# Patient Record
Sex: FEMALE | Race: WHITE | NOT HISPANIC OR LATINO | Employment: OTHER | URBAN - METROPOLITAN AREA
[De-identification: names, ages, dates, MRNs, and addresses within clinical notes are randomized per-mention and may not be internally consistent; named-entity substitution may affect disease eponyms.]

---

## 2017-02-06 ENCOUNTER — OFFICE VISIT (OUTPATIENT)
Dept: ORTHOPEDIC SURGERY | Facility: CLINIC | Age: 62
End: 2017-02-06

## 2017-02-06 VITALS — WEIGHT: 182 LBS | BODY MASS INDEX: 26.05 KG/M2 | HEIGHT: 70 IN | TEMPERATURE: 98.4 F

## 2017-02-06 DIAGNOSIS — G89.29 CHRONIC RIGHT SHOULDER PAIN: Primary | ICD-10-CM

## 2017-02-06 DIAGNOSIS — M25.511 CHRONIC RIGHT SHOULDER PAIN: Primary | ICD-10-CM

## 2017-02-06 DIAGNOSIS — M75.101 TEAR OF RIGHT ROTATOR CUFF, UNSPECIFIED TEAR EXTENT: ICD-10-CM

## 2017-02-06 PROCEDURE — 73030 X-RAY EXAM OF SHOULDER: CPT | Performed by: ORTHOPAEDIC SURGERY

## 2017-02-06 PROCEDURE — 99203 OFFICE O/P NEW LOW 30 MIN: CPT | Performed by: ORTHOPAEDIC SURGERY

## 2017-02-06 RX ORDER — OMEGA-3 FATTY ACIDS/FISH OIL 300-1000MG
200 CAPSULE ORAL 2 TIMES DAILY
COMMUNITY
End: 2021-04-20

## 2017-02-06 RX ORDER — ALBUTEROL SULFATE 90 UG/1
2 AEROSOL, METERED RESPIRATORY (INHALATION) EVERY 4 HOURS PRN
COMMUNITY

## 2017-02-06 RX ORDER — VALSARTAN AND HYDROCHLOROTHIAZIDE 160; 25 MG/1; MG/1
1 TABLET ORAL DAILY
COMMUNITY
Start: 2017-01-20

## 2017-02-06 RX ORDER — ESTROGEN,CON/M-PROGEST ACET 0.45-1.5MG
1 TABLET ORAL NIGHTLY
COMMUNITY
Start: 2017-01-20 | End: 2021-04-20

## 2017-02-06 RX ORDER — OCTISALATE, AVOBENZONE, HOMOSALATE, AND OCTOCRYLENE 29.4; 29.4; 49; 25.48 MG/ML; MG/ML; MG/ML; MG/ML
1 LOTION TOPICAL DAILY
COMMUNITY

## 2017-02-06 RX ORDER — MONTELUKAST SODIUM 10 MG/1
TABLET ORAL
COMMUNITY
Start: 2017-01-20 | End: 2020-09-10

## 2017-02-06 RX ORDER — METOPROLOL SUCCINATE 50 MG/1
50 TABLET, EXTENDED RELEASE ORAL NIGHTLY
COMMUNITY
Start: 2017-01-20

## 2017-02-06 RX ORDER — TEMAZEPAM 30 MG/1
15 CAPSULE ORAL NIGHTLY
Refills: 2 | COMMUNITY
Start: 2017-01-21

## 2017-02-06 RX ORDER — ATORVASTATIN CALCIUM 40 MG/1
40 TABLET, FILM COATED ORAL DAILY
COMMUNITY
Start: 2017-01-20

## 2017-02-06 RX ORDER — CETIRIZINE HYDROCHLORIDE 10 MG/1
10 TABLET ORAL DAILY
COMMUNITY

## 2017-02-07 NOTE — PROGRESS NOTES
"  Patient: Yenny Iglesias    YOB: 1955    Medical Record Number: [id number]    Chief Complaints:  Right shoulder pain and weakness    History of Present Illness:     61 y.o. female patient who presents for reevaluation of her right shoulder.  She tells me she was doing well up until about 6 months ago when she started to notice worsening pain.  She can't recall any specific inciting event or factor.  Initially the pain was intermittent but it has become constant.  She describes it as \"a constant nuisance\".  She gets night pain and has trouble washing her hair as well as using a curling iron.  She tells me that she has trouble doing any tasks at or above shoulder level.  She describes her current pain as moderate, intermittent, and aching.  Denies any mechanical symptoms or instability.  Anti-inflammatories have helped somewhat.  She tells me that the shoulder feels similar to prior to her repair.      Allergies: No Known Allergies    Home Medications:      Current Outpatient Prescriptions:   •  albuterol (PROAIR HFA) 108 (90 BASE) MCG/ACT inhaler, Inhale., Disp: , Rfl:   •  atorvastatin (LIPITOR) 40 MG tablet, , Disp: , Rfl:   •  cetirizine (zyrTEC) 10 MG tablet, Take 10 mg by mouth., Disp: , Rfl:   •  Ibuprofen 200 MG capsule, Take  by mouth., Disp: , Rfl:   •  metoprolol succinate XL (TOPROL-XL) 50 MG 24 hr tablet, , Disp: , Rfl:   •  mometasone-formoterol (DULERA) 200-5 MCG/ACT inhaler, Inhale., Disp: , Rfl:   •  montelukast (SINGULAIR) 10 MG tablet, , Disp: , Rfl:   •  PREMPRO 0.45-1.5 MG per tablet, , Disp: , Rfl:   •  Probiotic Product (ALIGN) 4 MG capsule, Take  by mouth., Disp: , Rfl:   •  temazepam (RESTORIL) 30 MG capsule, TK ONE C PO  QHS, Disp: , Rfl: 2  •  tiotropium (SPIRIVA) 18 MCG per inhalation capsule, Place 18 mcg into inhaler and inhale., Disp: , Rfl:   •  valsartan-hydrochlorothiazide (DIOVAN-HCT) 160-25 MG per tablet, , Disp: , Rfl:     Past Medical History   Diagnosis Date   • " "Asthma    • COPD (chronic obstructive pulmonary disease)    • CTS (carpal tunnel syndrome)      B/L   • Hypertension        Past Surgical History   Procedure Laterality Date   • Back surgery  08/2008   • Ankle surgery  02/2008   • Rotator cuff repair Right 10/20/2014   • Ankle hardware removal Left 07/14/2008   • Orif tibia/fibula fractures Left 02/27/2008       Social History     Occupational History   • Not on file.     Social History Main Topics   • Smoking status: Unknown If Ever Smoked   • Smokeless tobacco: Not on file   • Alcohol use Yes   • Drug use: Not on file   • Sexual activity: Not on file      Social History     Social History Narrative       Family History   Problem Relation Age of Onset   • Hypertension Other    • Diabetes Other    • Heart disease Other        Review of Systems:      Constitutional: Denies fever, shaking or chills   Eyes: Denies change in visual acuity   HEENT: Denies nasal congestion or sore throat   Respiratory: Denies cough or shortness of breath   Cardiovascular: Denies chest pain or edema  Endocrine: Denies tremors, palpitations, intolerance of heat or cold, polyuria, polydipsia.  GI: Denies abdominal pain, nausea, vomiting, bloody stools or diarrhea  : Denies frequency, urgency, incontinence, retention, or nocturia.  Musculoskeletal: Denies numbness tingling or loss of motor function except as above  Integument: Denies rash, lesion or ulceration   Neurologic: Denies headache or focal weakness, deficits  Heme: Denies epistaxis, spontaneous or excessive bleeding, epistaxis, hematuria, melena, fatigue, enlarged or tender lymph nodes.      All other pertinent positives and negatives as noted above in HPI.      Physical Exam: 61 y.o. female    Vitals:    02/06/17 1316   Temp: 98.4 °F (36.9 °C)   Weight: 182 lb (82.6 kg)   Height: 70\" (177.8 cm)       General:  Patient is awake and alert.  Appears in no acute distress or discomfort.    Psych:  Affect and demeanor are " appropriate.    Eyes:  Conjunctiva and sclera appear grossly normal.  Eyes track well and EOM seem to be intact.    Ears:  No gross abnormalities.  Hearing adequate for the exam.    Cardiovascular:  Regular rate and rhythm.    Lungs:  Good chest expansion.  Breathing unlabored.    Lymph:  No palpable masses or adenopathy in the affected extremity    Extremities:  The right shoulder is examined.  Previous surgical incisions are well-healed.  No palpable swellings or masses.  Moderate tenderness anteriorly over the rotator interval and biceps.  Full shoulder motion.  Positive belly press test.  Pain with a Bearhuggers maneuver as well as weakness relative to the other side.  No instability.  Positive Neer and Mott maneuvers.  Positive speeds maneuver.  Good strength in her deltoid.  Intact axillary nerve sensation.  Brisk capillary refill in her fingers.         Radiology:   AP, scapular Y, and axillary views of the right shoulder are ordered and reviewed.  These are compared to her previous x-rays.  She has what appears to be mild to moderate glenohumeral osteoarthritis as evidenced by a moderate goats Beard type osteophyte off of the humeral head.  Postsurgical changes from her previous rotator cuff repair are noted.  Acromiohumeral interval appears normal.  Of note, I did go back and look at her previous MRI.  She had a large retracted rotator cuff tear prior to her surgery.    Assessment/Plan:  Recurrent right rotator cuff tear with associated glenohumeral osteoarthritis    We talked about options for her.  I suggested that we try an injection and/or therapy.  She tells me she is not interested in that.  She has been down the road before and didn't really experience any long lasting relief.  She tells me that she is potentially interested in pursuing surgical options.  At the very least, she wants to know if her tendon has re-torn her that she can make an informed decision.  I have agreed to set her up for an  MRI.  I told her to call for the results of that study.  We will come up with a plan pending review of the MRI.    Jus Valdez MD    02/06/2017

## 2017-02-17 ENCOUNTER — HOSPITAL ENCOUNTER (OUTPATIENT)
Dept: MRI IMAGING | Facility: HOSPITAL | Age: 62
Discharge: HOME | End: 2017-02-17
Attending: ORTHOPAEDIC SURGERY | Admitting: ORTHOPAEDIC SURGERY

## 2017-02-17 ENCOUNTER — TELEPHONE (OUTPATIENT)
Dept: ORTHOPEDIC SURGERY | Facility: CLINIC | Age: 62
End: 2017-02-17

## 2017-02-17 DIAGNOSIS — G89.29 CHRONIC RIGHT SHOULDER PAIN: ICD-10-CM

## 2017-02-17 DIAGNOSIS — M25.511 CHRONIC RIGHT SHOULDER PAIN: ICD-10-CM

## 2017-02-17 PROCEDURE — 73221 MRI JOINT UPR EXTREM W/O DYE: CPT

## 2017-02-20 NOTE — TELEPHONE ENCOUNTER
Pt. Asked if this was going to get to Dr. Gomes.  She does not need the images.  I told her he was a part of our office and able to view this.  XR-ED

## 2017-05-01 ENCOUNTER — OFFICE VISIT (OUTPATIENT)
Dept: ORTHOPEDIC SURGERY | Facility: CLINIC | Age: 62
End: 2017-05-01

## 2017-05-01 VITALS — WEIGHT: 178 LBS | TEMPERATURE: 97.1 F | HEIGHT: 70 IN | BODY MASS INDEX: 25.48 KG/M2

## 2017-05-01 DIAGNOSIS — M75.121 COMPLETE TEAR OF RIGHT ROTATOR CUFF: Primary | ICD-10-CM

## 2017-05-01 PROCEDURE — 20610 DRAIN/INJ JOINT/BURSA W/O US: CPT | Performed by: ORTHOPAEDIC SURGERY

## 2017-05-01 PROCEDURE — 99214 OFFICE O/P EST MOD 30 MIN: CPT | Performed by: ORTHOPAEDIC SURGERY

## 2017-05-01 RX ORDER — BUPIVACAINE HYDROCHLORIDE 5 MG/ML
2 INJECTION, SOLUTION PERINEURAL
Status: COMPLETED | OUTPATIENT
Start: 2017-05-01 | End: 2017-05-01

## 2017-05-01 RX ORDER — METHYLPREDNISOLONE ACETATE 80 MG/ML
160 INJECTION, SUSPENSION INTRA-ARTICULAR; INTRALESIONAL; INTRAMUSCULAR; SOFT TISSUE
Status: COMPLETED | OUTPATIENT
Start: 2017-05-01 | End: 2017-05-01

## 2017-05-01 RX ORDER — LIDOCAINE HYDROCHLORIDE 10 MG/ML
2 INJECTION, SOLUTION INFILTRATION; PERINEURAL
Status: COMPLETED | OUTPATIENT
Start: 2017-05-01 | End: 2017-05-01

## 2017-05-01 RX ADMIN — BUPIVACAINE HYDROCHLORIDE 2 ML: 5 INJECTION, SOLUTION PERINEURAL at 14:12

## 2017-05-01 RX ADMIN — METHYLPREDNISOLONE ACETATE 160 MG: 80 INJECTION, SUSPENSION INTRA-ARTICULAR; INTRALESIONAL; INTRAMUSCULAR; SOFT TISSUE at 14:12

## 2017-05-01 RX ADMIN — LIDOCAINE HYDROCHLORIDE 2 ML: 10 INJECTION, SOLUTION INFILTRATION; PERINEURAL at 14:12

## 2017-08-30 ENCOUNTER — CLINICAL SUPPORT (OUTPATIENT)
Dept: ORTHOPEDIC SURGERY | Facility: CLINIC | Age: 62
End: 2017-08-30

## 2017-08-30 VITALS — BODY MASS INDEX: 24.77 KG/M2 | TEMPERATURE: 98.2 F | HEIGHT: 70 IN | WEIGHT: 173 LBS

## 2017-08-30 DIAGNOSIS — M75.121 COMPLETE TEAR OF RIGHT ROTATOR CUFF: Primary | ICD-10-CM

## 2017-08-30 PROCEDURE — 99212 OFFICE O/P EST SF 10 MIN: CPT | Performed by: ORTHOPAEDIC SURGERY

## 2017-08-30 PROCEDURE — 20610 DRAIN/INJ JOINT/BURSA W/O US: CPT | Performed by: ORTHOPAEDIC SURGERY

## 2017-08-30 RX ORDER — BUPIVACAINE HYDROCHLORIDE 5 MG/ML
2 INJECTION, SOLUTION PERINEURAL
Status: COMPLETED | OUTPATIENT
Start: 2017-08-30 | End: 2017-08-30

## 2017-08-30 RX ORDER — METHYLPREDNISOLONE ACETATE 80 MG/ML
160 INJECTION, SUSPENSION INTRA-ARTICULAR; INTRALESIONAL; INTRAMUSCULAR; SOFT TISSUE
Status: COMPLETED | OUTPATIENT
Start: 2017-08-30 | End: 2017-08-30

## 2017-08-30 RX ORDER — LIDOCAINE HYDROCHLORIDE 10 MG/ML
2 INJECTION, SOLUTION INFILTRATION; PERINEURAL
Status: COMPLETED | OUTPATIENT
Start: 2017-08-30 | End: 2017-08-30

## 2017-08-30 RX ORDER — METHYLPREDNISOLONE 4 MG/1
TABLET ORAL
Qty: 21 TABLET | Refills: 0 | Status: SHIPPED | OUTPATIENT
Start: 2017-08-30 | End: 2018-06-25

## 2017-08-30 RX ADMIN — LIDOCAINE HYDROCHLORIDE 2 ML: 10 INJECTION, SOLUTION INFILTRATION; PERINEURAL at 21:21

## 2017-08-30 RX ADMIN — METHYLPREDNISOLONE ACETATE 160 MG: 80 INJECTION, SUSPENSION INTRA-ARTICULAR; INTRALESIONAL; INTRAMUSCULAR; SOFT TISSUE at 21:21

## 2017-08-30 RX ADMIN — BUPIVACAINE HYDROCHLORIDE 2 ML: 5 INJECTION, SOLUTION PERINEURAL at 21:21

## 2017-12-11 ENCOUNTER — APPOINTMENT (OUTPATIENT)
Dept: WOMENS IMAGING | Facility: HOSPITAL | Age: 62
End: 2017-12-11

## 2017-12-11 PROCEDURE — G0202 SCR MAMMO BI INCL CAD: HCPCS | Performed by: RADIOLOGY

## 2017-12-11 PROCEDURE — 77063 BREAST TOMOSYNTHESIS BI: CPT | Performed by: RADIOLOGY

## 2017-12-15 ENCOUNTER — OFFICE VISIT (OUTPATIENT)
Dept: ORTHOPEDIC SURGERY | Facility: CLINIC | Age: 62
End: 2017-12-15

## 2017-12-15 VITALS — BODY MASS INDEX: 24.22 KG/M2 | HEIGHT: 71 IN | TEMPERATURE: 97.5 F | WEIGHT: 173 LBS

## 2017-12-15 DIAGNOSIS — M75.121 COMPLETE TEAR OF RIGHT ROTATOR CUFF: Primary | ICD-10-CM

## 2017-12-15 PROCEDURE — 99212 OFFICE O/P EST SF 10 MIN: CPT | Performed by: ORTHOPAEDIC SURGERY

## 2017-12-15 PROCEDURE — 20610 DRAIN/INJ JOINT/BURSA W/O US: CPT | Performed by: ORTHOPAEDIC SURGERY

## 2017-12-15 RX ADMIN — BUPIVACAINE HYDROCHLORIDE 2 ML: 5 INJECTION, SOLUTION PERINEURAL at 14:09

## 2017-12-15 RX ADMIN — METHYLPREDNISOLONE ACETATE 160 MG: 80 INJECTION, SUSPENSION INTRA-ARTICULAR; INTRALESIONAL; INTRAMUSCULAR; SOFT TISSUE at 14:09

## 2017-12-15 RX ADMIN — LIDOCAINE HYDROCHLORIDE 2 ML: 10 INJECTION, SOLUTION INFILTRATION; PERINEURAL at 14:09

## 2017-12-17 RX ORDER — BUPIVACAINE HYDROCHLORIDE 5 MG/ML
2 INJECTION, SOLUTION PERINEURAL
Status: COMPLETED | OUTPATIENT
Start: 2017-12-15 | End: 2017-12-15

## 2017-12-17 RX ORDER — LIDOCAINE HYDROCHLORIDE 10 MG/ML
2 INJECTION, SOLUTION INFILTRATION; PERINEURAL
Status: COMPLETED | OUTPATIENT
Start: 2017-12-15 | End: 2017-12-15

## 2017-12-17 RX ORDER — METHYLPREDNISOLONE ACETATE 80 MG/ML
160 INJECTION, SUSPENSION INTRA-ARTICULAR; INTRALESIONAL; INTRAMUSCULAR; SOFT TISSUE
Status: COMPLETED | OUTPATIENT
Start: 2017-12-15 | End: 2017-12-15

## 2017-12-17 NOTE — PROGRESS NOTES
Chief Complaint:  Right shoulder injury    HPI:  Mrs. Iglesias comes in today for follow-up of her right shoulder.  She fell this past Wednesday while taking her dog out.  She felt a sharp pain in the shoulder.  She has struggled to raise the arm up since.  She describes her pain as moderate, constant, and aching.    Exam:  Skin is benign.  No step-offs or crepitus about the right shoulder.  No bony tenderness.  Passive motion is excellent.  She struggles to elevate and abduct due to discomfort.  If I raise her arm up for her, she can maintain it in an elevated position without discomfort.    Imaging:  None taken    Assessment:  Right shoulder acute on chronic rotator cuff tear    Plan:  We discussed options for her including further workup and potentially surgery.  She wants to get the injection repeated today.  The risks, benefits, and alternatives were discussed.  She consented.  Going forward, she will follow up as needed.    Large Joint Arthrocentesis  Date/Time: 12/15/2017 2:09 PM  Consent given by: patient  Site marked: site marked  Timeout: Immediately prior to procedure a time out was called to verify the correct patient, procedure, equipment, support staff and site/side marked as required   Supporting Documentation  Indications: pain and joint swelling   Procedure Details  Location: shoulder - R subacromial bursa  Preparation: Patient was prepped and draped in the usual sterile fashion  Needle size: 25 G  Approach: posterior  Medications administered: 2 mL lidocaine 1 %; 160 mg methylPREDNISolone acetate 80 MG/ML; 2 mL bupivacaine 0.5 %  Patient tolerance: patient tolerated the procedure well with no immediate complications            Jus Valdez MD  12/15/2017

## 2017-12-20 ENCOUNTER — TELEPHONE (OUTPATIENT)
Dept: ORTHOPEDIC SURGERY | Facility: CLINIC | Age: 62
End: 2017-12-20

## 2018-03-16 ENCOUNTER — OFFICE VISIT (OUTPATIENT)
Dept: ORTHOPEDIC SURGERY | Facility: CLINIC | Age: 63
End: 2018-03-16

## 2018-03-16 VITALS — WEIGHT: 178 LBS | TEMPERATURE: 98 F | HEIGHT: 70 IN | BODY MASS INDEX: 25.48 KG/M2

## 2018-03-16 DIAGNOSIS — M75.121 COMPLETE TEAR OF RIGHT ROTATOR CUFF: Primary | ICD-10-CM

## 2018-03-16 PROCEDURE — 20610 DRAIN/INJ JOINT/BURSA W/O US: CPT | Performed by: ORTHOPAEDIC SURGERY

## 2018-03-16 RX ORDER — CYCLOBENZAPRINE HCL 10 MG
10 TABLET ORAL 2 TIMES DAILY PRN
Qty: 60 TABLET | Refills: 0 | Status: SHIPPED | OUTPATIENT
Start: 2018-03-16 | End: 2020-09-10

## 2018-03-16 RX ADMIN — LIDOCAINE HYDROCHLORIDE 2 ML: 10 INJECTION, SOLUTION INFILTRATION; PERINEURAL at 15:55

## 2018-03-16 RX ADMIN — METHYLPREDNISOLONE ACETATE 160 MG: 80 INJECTION, SUSPENSION INTRA-ARTICULAR; INTRALESIONAL; INTRAMUSCULAR; SOFT TISSUE at 15:55

## 2018-03-18 RX ORDER — LIDOCAINE HYDROCHLORIDE 10 MG/ML
2 INJECTION, SOLUTION INFILTRATION; PERINEURAL
Status: COMPLETED | OUTPATIENT
Start: 2018-03-16 | End: 2018-03-16

## 2018-03-18 RX ORDER — METHYLPREDNISOLONE ACETATE 80 MG/ML
160 INJECTION, SUSPENSION INTRA-ARTICULAR; INTRALESIONAL; INTRAMUSCULAR; SOFT TISSUE
Status: COMPLETED | OUTPATIENT
Start: 2018-03-16 | End: 2018-03-16

## 2018-03-18 NOTE — PROGRESS NOTES
Ms. Iglesias comes in today for follow-up of the right shoulder.  She wants to get the injection repeated.  We again discussed the natural history of rotator cuff tears and she is simply not ready to consider any surgical options.    The risks, benefits, and alternatives to repeat injection were discussed.  She consented.  She will follow up as needed.    Large Joint Arthrocentesis  Date/Time: 3/16/2018 3:55 PM  Consent given by: patient  Site marked: site marked  Timeout: Immediately prior to procedure a time out was called to verify the correct patient, procedure, equipment, support staff and site/side marked as required   Supporting Documentation  Indications: pain and joint swelling   Procedure Details  Location: shoulder - R subacromial bursa  Preparation: Patient was prepped and draped in the usual sterile fashion  Needle size: 25 G  Approach: posterior  Medications administered: 2 mL lidocaine 1 %; 160 mg methylPREDNISolone acetate 80 MG/ML  Patient tolerance: patient tolerated the procedure well with no immediate complications

## 2018-06-25 ENCOUNTER — CLINICAL SUPPORT (OUTPATIENT)
Dept: ORTHOPEDIC SURGERY | Facility: CLINIC | Age: 63
End: 2018-06-25

## 2018-06-25 VITALS — BODY MASS INDEX: 24.77 KG/M2 | WEIGHT: 173 LBS | HEIGHT: 70 IN

## 2018-06-25 DIAGNOSIS — M75.100 TEAR OF ROTATOR CUFF, UNSPECIFIED LATERALITY, UNSPECIFIED TEAR EXTENT: ICD-10-CM

## 2018-06-25 DIAGNOSIS — M25.511 CHRONIC RIGHT SHOULDER PAIN: Primary | ICD-10-CM

## 2018-06-25 DIAGNOSIS — G89.29 CHRONIC RIGHT SHOULDER PAIN: Primary | ICD-10-CM

## 2018-06-25 PROCEDURE — 20610 DRAIN/INJ JOINT/BURSA W/O US: CPT | Performed by: ORTHOPAEDIC SURGERY

## 2018-06-25 PROCEDURE — 73030 X-RAY EXAM OF SHOULDER: CPT | Performed by: ORTHOPAEDIC SURGERY

## 2018-06-25 RX ADMIN — LIDOCAINE HYDROCHLORIDE 2 ML: 10 INJECTION, SOLUTION EPIDURAL; INFILTRATION; INTRACAUDAL; PERINEURAL at 14:53

## 2018-06-25 RX ADMIN — METHYLPREDNISOLONE ACETATE 80 MG: 80 INJECTION, SUSPENSION INTRA-ARTICULAR; INTRALESIONAL; INTRAMUSCULAR; SOFT TISSUE at 14:53

## 2018-06-25 NOTE — PROGRESS NOTES
Ms. Iglesias comes in today for follow-up of the right shoulder.  The last injection helped tremendously.  She would like to get the injection repeated today.  Repeat AP, scapular Y and axillary views of the right shoulder are ordered and reviewed.  She has findings consistent with early rotator cuff tear arthropathy with mild degenerative changes and a diminished acromiohumeral interval.  The risks, benefits, and alternatives to repeat injection were discussed.  She consented.  Going forward, she will follow up as needed.    Large Joint Arthrocentesis  Date/Time: 6/25/2018 2:53 PM  Consent given by: patient  Site marked: site marked  Timeout: Immediately prior to procedure a time out was called to verify the correct patient, procedure, equipment, support staff and site/side marked as required   Supporting Documentation  Indications: pain   Procedure Details  Location: shoulder - R subacromial bursa  Preparation: Patient was prepped and draped in the usual sterile fashion  Needle gauge: 21 g.  Approach: posterior  Medications administered: 80 mg methylPREDNISolone acetate 80 MG/ML; 2 mL lidocaine PF 1% 1 %  Patient tolerance: patient tolerated the procedure well with no immediate complications

## 2018-06-26 RX ORDER — METHYLPREDNISOLONE ACETATE 80 MG/ML
80 INJECTION, SUSPENSION INTRA-ARTICULAR; INTRALESIONAL; INTRAMUSCULAR; SOFT TISSUE
Status: COMPLETED | OUTPATIENT
Start: 2018-06-25 | End: 2018-06-25

## 2018-06-26 RX ORDER — LIDOCAINE HYDROCHLORIDE 10 MG/ML
2 INJECTION, SOLUTION EPIDURAL; INFILTRATION; INTRACAUDAL; PERINEURAL
Status: COMPLETED | OUTPATIENT
Start: 2018-06-25 | End: 2018-06-25

## 2018-12-17 ENCOUNTER — APPOINTMENT (OUTPATIENT)
Dept: WOMENS IMAGING | Facility: HOSPITAL | Age: 63
End: 2018-12-17

## 2018-12-17 PROCEDURE — 77063 BREAST TOMOSYNTHESIS BI: CPT | Performed by: RADIOLOGY

## 2018-12-17 PROCEDURE — 77067 SCR MAMMO BI INCL CAD: CPT | Performed by: RADIOLOGY

## 2019-02-01 ENCOUNTER — CLINICAL SUPPORT (OUTPATIENT)
Dept: ORTHOPEDIC SURGERY | Facility: CLINIC | Age: 64
End: 2019-02-01

## 2019-02-01 VITALS — BODY MASS INDEX: 25.62 KG/M2 | WEIGHT: 173 LBS | TEMPERATURE: 97.7 F | HEIGHT: 69 IN

## 2019-02-01 DIAGNOSIS — M75.121 COMPLETE TEAR OF RIGHT ROTATOR CUFF: Primary | ICD-10-CM

## 2019-02-01 PROCEDURE — 20610 DRAIN/INJ JOINT/BURSA W/O US: CPT | Performed by: NURSE PRACTITIONER

## 2019-02-01 RX ORDER — LIDOCAINE HYDROCHLORIDE 10 MG/ML
2 INJECTION, SOLUTION EPIDURAL; INFILTRATION; INTRACAUDAL; PERINEURAL
Status: COMPLETED | OUTPATIENT
Start: 2019-02-01 | End: 2019-02-01

## 2019-02-01 RX ORDER — METHYLPREDNISOLONE ACETATE 80 MG/ML
80 INJECTION, SUSPENSION INTRA-ARTICULAR; INTRALESIONAL; INTRAMUSCULAR; SOFT TISSUE
Status: COMPLETED | OUTPATIENT
Start: 2019-02-01 | End: 2019-02-01

## 2019-02-01 RX ADMIN — METHYLPREDNISOLONE ACETATE 80 MG: 80 INJECTION, SUSPENSION INTRA-ARTICULAR; INTRALESIONAL; INTRAMUSCULAR; SOFT TISSUE at 13:06

## 2019-02-01 RX ADMIN — LIDOCAINE HYDROCHLORIDE 2 ML: 10 INJECTION, SOLUTION EPIDURAL; INFILTRATION; INTRACAUDAL; PERINEURAL at 13:06

## 2019-02-01 NOTE — PROGRESS NOTES
Ms. Iglesias comes in today for follow-up.  The last injection helped tremendously, but reports she fell on the ice 2 weeks ago and landed directly on her right shoulder.  This episode has caused her pain to intensify.  She would like to get a repeat injection today.      We had a lengthy discussion regarding the risks of repeat steroid injections to treat pain due to rotator cuff tears.  She acknowledged understanding and reports she is not quite ready for surgery. The risks, benefits and alternatives were discussed and the patient consented.  Going forward, the patient will follow-up as needed.    PJ Mcmahan    02/01/2019    Large Joint Arthrocentesis: R subacromial bursa  Date/Time: 2/1/2019 1:06 PM  Consent given by: patient  Site marked: site marked  Timeout: Immediately prior to procedure a time out was called to verify the correct patient, procedure, equipment, support staff and site/side marked as required   Supporting Documentation  Indications: pain   Procedure Details  Location: shoulder - R subacromial bursa  Preparation: Patient was prepped and draped in the usual sterile fashion  Needle gauge: 21 G.  Approach: posterior  Medications administered: 2 mL lidocaine PF 1% 1 %; 80 mg methylPREDNISolone acetate 80 MG/ML  Patient tolerance: patient tolerated the procedure well with no immediate complications

## 2019-12-18 ENCOUNTER — APPOINTMENT (OUTPATIENT)
Dept: WOMENS IMAGING | Facility: HOSPITAL | Age: 64
End: 2019-12-18

## 2019-12-18 PROCEDURE — 77063 BREAST TOMOSYNTHESIS BI: CPT | Performed by: RADIOLOGY

## 2019-12-18 PROCEDURE — 77067 SCR MAMMO BI INCL CAD: CPT | Performed by: RADIOLOGY

## 2020-06-24 ENCOUNTER — OFFICE VISIT (OUTPATIENT)
Dept: ORTHOPEDIC SURGERY | Facility: CLINIC | Age: 65
End: 2020-06-24

## 2020-06-24 VITALS — HEIGHT: 71 IN | WEIGHT: 172 LBS | BODY MASS INDEX: 24.08 KG/M2 | TEMPERATURE: 96.7 F

## 2020-06-24 DIAGNOSIS — M25.511 RIGHT SHOULDER PAIN, UNSPECIFIED CHRONICITY: Primary | ICD-10-CM

## 2020-06-24 PROBLEM — I10 HYPERTENSION: Status: ACTIVE | Noted: 2018-10-04

## 2020-06-24 PROBLEM — J32.9 SINUSITIS: Status: ACTIVE | Noted: 2018-10-04

## 2020-06-24 PROBLEM — J45.909 ASTHMA: Status: ACTIVE | Noted: 2018-10-04

## 2020-06-24 PROBLEM — R09.02 HYPOXEMIA: Status: ACTIVE | Noted: 2018-10-04

## 2020-06-24 PROBLEM — J44.9 COPD (CHRONIC OBSTRUCTIVE PULMONARY DISEASE) (HCC): Status: ACTIVE | Noted: 2018-10-04

## 2020-06-24 PROBLEM — R91.1 LUNG NODULE: Status: ACTIVE | Noted: 2018-10-04

## 2020-06-24 PROCEDURE — 20610 DRAIN/INJ JOINT/BURSA W/O US: CPT | Performed by: ORTHOPAEDIC SURGERY

## 2020-06-24 PROCEDURE — 73030 X-RAY EXAM OF SHOULDER: CPT | Performed by: ORTHOPAEDIC SURGERY

## 2020-06-24 PROCEDURE — 99212 OFFICE O/P EST SF 10 MIN: CPT | Performed by: ORTHOPAEDIC SURGERY

## 2020-06-24 RX ORDER — METHYLPREDNISOLONE ACETATE 80 MG/ML
80 INJECTION, SUSPENSION INTRA-ARTICULAR; INTRALESIONAL; INTRAMUSCULAR; SOFT TISSUE
Status: COMPLETED | OUTPATIENT
Start: 2020-06-24 | End: 2020-06-24

## 2020-06-24 RX ORDER — LIDOCAINE HYDROCHLORIDE 20 MG/ML
2 INJECTION, SOLUTION EPIDURAL; INFILTRATION; INTRACAUDAL; PERINEURAL
Status: COMPLETED | OUTPATIENT
Start: 2020-06-24 | End: 2020-06-24

## 2020-06-24 RX ORDER — BUDESONIDE AND FORMOTEROL FUMARATE DIHYDRATE 160; 4.5 UG/1; UG/1
2 AEROSOL RESPIRATORY (INHALATION) DAILY
COMMUNITY
Start: 2019-11-05 | End: 2021-01-27

## 2020-06-24 RX ADMIN — METHYLPREDNISOLONE ACETATE 80 MG: 80 INJECTION, SUSPENSION INTRA-ARTICULAR; INTRALESIONAL; INTRAMUSCULAR; SOFT TISSUE at 15:04

## 2020-06-24 RX ADMIN — LIDOCAINE HYDROCHLORIDE 2 ML: 20 INJECTION, SOLUTION EPIDURAL; INFILTRATION; INTRACAUDAL; PERINEURAL at 15:04

## 2020-06-24 NOTE — PROGRESS NOTES
Chief complaint: Worsening right shoulder pain    Ms. Iglesias follows up today for her right shoulder.  She wants to discuss surgical options.  I told her that my opinion is that she would probably need a reverse arthroplasty.  We had a lengthy discussion about that procedure.  She had a number of questions that were answered in detail.  She is going to think about the replacement.  For now, she would like to get her injection repeated.    I did repeat x-rays on her today including AP, scapular Y and axillary views of the right shoulder.  These are compared to her previous x-rays.  She has moderate glenohumeral arthritis and a slightly diminished acromiohumeral interval consistent with chronic rotator cuff tear.  The risk, benefits and alternatives were discussed.  She consented and the injection was performed as described below.    Large Joint Arthrocentesis: R subacromial bursa  Date/Time: 6/24/2020 3:04 PM  Consent given by: patient  Site marked: site marked  Timeout: Immediately prior to procedure a time out was called to verify the correct patient, procedure, equipment, support staff and site/side marked as required   Supporting Documentation  Indications: pain   Procedure Details  Location: shoulder - R subacromial bursa  Preparation: Patient was prepped and draped in the usual sterile fashion  Needle gauge: 21 G.  Approach: posterior  Medications administered: 2 mL lidocaine PF 2% 2 %; 80 mg methylPREDNISolone acetate 80 MG/ML  Patient tolerance: patient tolerated the procedure well with no immediate complications      \

## 2020-08-12 ENCOUNTER — PREP FOR SURGERY (OUTPATIENT)
Dept: OTHER | Facility: HOSPITAL | Age: 65
End: 2020-08-12

## 2020-08-12 ENCOUNTER — TELEPHONE (OUTPATIENT)
Dept: ORTHOPEDIC SURGERY | Facility: CLINIC | Age: 65
End: 2020-08-12

## 2020-08-12 DIAGNOSIS — M75.121 COMPLETE TEAR OF RIGHT ROTATOR CUFF: Primary | ICD-10-CM

## 2020-08-12 PROBLEM — M12.819 ROTATOR CUFF ARTHROPATHY: Status: ACTIVE | Noted: 2020-08-12

## 2020-08-12 RX ORDER — ACETAMINOPHEN 500 MG
1000 TABLET ORAL ONCE
Status: CANCELLED | OUTPATIENT
Start: 2020-09-17 | End: 2020-08-12

## 2020-08-12 RX ORDER — VANCOMYCIN HYDROCHLORIDE 1 G/200ML
1000 INJECTION, SOLUTION INTRAVENOUS ONCE
Status: CANCELLED | OUTPATIENT
Start: 2020-09-17 | End: 2020-08-12

## 2020-08-12 RX ORDER — PREGABALIN 75 MG/1
150 CAPSULE ORAL ONCE
Status: CANCELLED | OUTPATIENT
Start: 2020-09-17 | End: 2020-08-12

## 2020-08-12 RX ORDER — CEFAZOLIN SODIUM 2 G/100ML
2 INJECTION, SOLUTION INTRAVENOUS ONCE
Status: CANCELLED | OUTPATIENT
Start: 2020-09-17 | End: 2020-08-12

## 2020-08-12 RX ORDER — MELOXICAM 15 MG/1
15 TABLET ORAL ONCE
Status: CANCELLED | OUTPATIENT
Start: 2020-09-17 | End: 2020-08-12

## 2020-08-31 PROBLEM — M75.121 COMPLETE TEAR OF RIGHT ROTATOR CUFF: Status: ACTIVE | Noted: 2020-08-31

## 2020-09-08 ENCOUNTER — TRANSCRIBE ORDERS (OUTPATIENT)
Dept: PREADMISSION TESTING | Facility: HOSPITAL | Age: 65
End: 2020-09-08

## 2020-09-08 DIAGNOSIS — Z01.818 OTHER SPECIFIED PRE-OPERATIVE EXAMINATION: Primary | ICD-10-CM

## 2020-09-10 ENCOUNTER — APPOINTMENT (OUTPATIENT)
Dept: PREADMISSION TESTING | Facility: HOSPITAL | Age: 65
End: 2020-09-10

## 2020-09-10 VITALS
DIASTOLIC BLOOD PRESSURE: 83 MMHG | BODY MASS INDEX: 25.48 KG/M2 | OXYGEN SATURATION: 95 % | RESPIRATION RATE: 20 BRPM | HEIGHT: 69 IN | TEMPERATURE: 97.6 F | WEIGHT: 172 LBS | SYSTOLIC BLOOD PRESSURE: 147 MMHG | HEART RATE: 72 BPM

## 2020-09-10 DIAGNOSIS — M75.121 NONTRAUMATIC COMPLETE TEAR OF RIGHT ROTATOR CUFF: ICD-10-CM

## 2020-09-10 DIAGNOSIS — M75.121 COMPLETE TEAR OF RIGHT ROTATOR CUFF: ICD-10-CM

## 2020-09-10 LAB
ANION GAP SERPL CALCULATED.3IONS-SCNC: 10.3 MMOL/L (ref 5–15)
BILIRUB UR QL STRIP: NEGATIVE
BUN SERPL-MCNC: 9 MG/DL (ref 8–23)
BUN/CREAT SERPL: 16.7 (ref 7–25)
CALCIUM SPEC-SCNC: 9.3 MG/DL (ref 8.6–10.5)
CHLORIDE SERPL-SCNC: 101 MMOL/L (ref 98–107)
CLARITY UR: CLEAR
CO2 SERPL-SCNC: 24.7 MMOL/L (ref 22–29)
COLOR UR: YELLOW
CREAT SERPL-MCNC: 0.54 MG/DL (ref 0.57–1)
DEPRECATED RDW RBC AUTO: 44.5 FL (ref 37–54)
ERYTHROCYTE [DISTWIDTH] IN BLOOD BY AUTOMATED COUNT: 13.6 % (ref 12.3–15.4)
GFR SERPL CREATININE-BSD FRML MDRD: 113 ML/MIN/1.73
GFR SERPL CREATININE-BSD FRML MDRD: 136 ML/MIN/1.73
GLUCOSE SERPL-MCNC: 117 MG/DL (ref 65–99)
GLUCOSE UR STRIP-MCNC: NEGATIVE MG/DL
HCT VFR BLD AUTO: 38.1 % (ref 34–46.6)
HGB BLD-MCNC: 13 G/DL (ref 12–15.9)
HGB UR QL STRIP.AUTO: NEGATIVE
KETONES UR QL STRIP: NEGATIVE
LEUKOCYTE ESTERASE UR QL STRIP.AUTO: NEGATIVE
MCH RBC QN AUTO: 30.3 PG (ref 26.6–33)
MCHC RBC AUTO-ENTMCNC: 34.1 G/DL (ref 31.5–35.7)
MCV RBC AUTO: 88.8 FL (ref 79–97)
NITRITE UR QL STRIP: NEGATIVE
PH UR STRIP.AUTO: 6.5 [PH] (ref 5–8)
PLATELET # BLD AUTO: 233 10*3/MM3 (ref 140–450)
PMV BLD AUTO: 9.2 FL (ref 6–12)
POTASSIUM SERPL-SCNC: 3.3 MMOL/L (ref 3.5–5.2)
PROT UR QL STRIP: NEGATIVE
RBC # BLD AUTO: 4.29 10*6/MM3 (ref 3.77–5.28)
SODIUM SERPL-SCNC: 136 MMOL/L (ref 136–145)
SP GR UR STRIP: 1.01 (ref 1–1.03)
UROBILINOGEN UR QL STRIP: NORMAL
WBC # BLD AUTO: 6.94 10*3/MM3 (ref 3.4–10.8)

## 2020-09-10 PROCEDURE — 80048 BASIC METABOLIC PNL TOTAL CA: CPT | Performed by: ORTHOPAEDIC SURGERY

## 2020-09-10 PROCEDURE — 36415 COLL VENOUS BLD VENIPUNCTURE: CPT

## 2020-09-10 PROCEDURE — 93010 ELECTROCARDIOGRAM REPORT: CPT | Performed by: INTERNAL MEDICINE

## 2020-09-10 PROCEDURE — 93005 ELECTROCARDIOGRAM TRACING: CPT

## 2020-09-10 PROCEDURE — 85027 COMPLETE CBC AUTOMATED: CPT | Performed by: ORTHOPAEDIC SURGERY

## 2020-09-10 PROCEDURE — 81003 URINALYSIS AUTO W/O SCOPE: CPT | Performed by: ORTHOPAEDIC SURGERY

## 2020-09-10 RX ORDER — ASPIRIN 81 MG/1
81 TABLET ORAL DAILY
COMMUNITY

## 2020-09-10 RX ORDER — CHOLECALCIFEROL (VITAMIN D3) 50 MCG
2000 TABLET ORAL DAILY
COMMUNITY

## 2020-09-10 RX ORDER — ACETAMINOPHEN 325 MG/1
650 TABLET ORAL EVERY 6 HOURS PRN
COMMUNITY
End: 2020-09-18 | Stop reason: HOSPADM

## 2020-09-10 RX ORDER — CHLORHEXIDINE GLUCONATE 500 MG/1
CLOTH TOPICAL
COMMUNITY
End: 2020-09-18 | Stop reason: HOSPADM

## 2020-09-10 NOTE — DISCHARGE INSTRUCTIONS
CHLORHEXIDINE CLOTH INSTRUCTIONS  The morning of surgery follow these instructions using the Chlorhexidine cloths you've been given.  These steps reduce bacteria on the body.  Do not use the cloths near your eyes, ears mouth, genitalia or on open wounds.  Throw the cloths away after use but do not try to flush them down a toilet.      • Open and remove one cloth at a time from the package.    • Leave the cloth unfolded and begin the bathing.  • Massage the skin with the cloths using gentle pressure to remove bacteria.  Do not scrub harshly.   • Follow the steps below with one 2% CHG cloth per area (6 total cloths).  • One cloth for neck, shoulders and chest.  • One cloth for both arms, hands, fingers and underarms (do underarms last).  • One cloth for the abdomen followed by groin.  • One cloth for right leg and foot including between the toes.  • One cloth for left leg and foot including between the toes.  • The last cloth is to be used for the back of the neck, back and buttocks.    Allow the CHG to air dry 3 minutes on the skin which will give it time to work and decrease the chance of irritation.  The skin may feel sticky until it is dry.  Do not rinse with water or any other liquid or you will lose the beneficial effects of the CHG.  If mild skin irritation occurs, do rinse the skin to remove the CHG.  Report this to the nurse at time of admission.  Do not apply lotions, creams, ointments, deodorants or perfumes after using the clothes. Dress in clean clothes before coming to the hospital.    BACTROBAN NASAL OINTMENT  There are many germs normally in your nose. Bactroban is an ointment that will help reduce these germs. Please follow these instructions for Bactroban use:      ____The day before surgery in the morning  Date________    ____The day before surgery in the evening              Date________    ____The day of surgery in the morning    Date________    **Squirt ½ package of Bactroban Ointment onto a  cotton applicator and apply to inside of 1st nostril.  Squirt the remaining Bactroban and apply to the inside of the other nostril.      Take the following medications the morning of surgery:  INHALERS    ARRIVAL TIME 0600 TO MAIN OR       If you are on prescription narcotic pain medication to control your pain you may also take that medication the morning of surgery.    General Instructions:  • Do not eat solid food after midnight the night before surgery.  • You may drink clear liquids day of surgery but must stop at least one hour before your hospital arrival time - CUTOFF TIME 0500  • It is beneficial for you to have a clear drink that contains carbohydrates the day of surgery.  We suggest a 12 to 20 ounce bottle of Gatorade or Powerade for non-diabetic patients or a 12 to 20 ounce bottle of G2 or Powerade Zero for diabetic patients. (Pediatric patients, are not advised to drink a 12 to 20 ounce carbohydrate drink)    Clear liquids are liquids you can see through.  Nothing red in color.     Plain water                               Sports drinks  Sodas                                   Gelatin (Jell-O)  Fruit juices without pulp such as white grape juice and apple juice  Popsicles that contain no fruit or yogurt  Tea or coffee (no cream or milk added)  Gatorade / Powerade  G2 / Powerade Zero    • Infants may have breast milk up to four hours before surgery.  • Infants drinking formula may drink formula up to six hours before surgery.   • Patients who avoid smoking, chewing tobacco and alcohol for 4 weeks prior to surgery have a reduced risk of post-operative complications.  Quit smoking as many days before surgery as you can.  • Do not smoke, use chewing tobacco or drink alcohol the day of surgery.   • If applicable bring your C-PAP/ BI-PAP machine.  • Bring any papers given to you in the doctor’s office.  • Wear clean comfortable clothes.  • Do not wear contact lenses, false eyelashes or make-up.  Bring a case  for your glasses.   • Bring crutches or walker if applicable.  • Remove all piercings.  Leave jewelry and any other valuables at home.  • Hair extensions with metal clips must be removed prior to surgery.  • The Pre-Admission Testing nurse will instruct you to bring medications if unable to obtain an accurate list in Pre-Admission Testing.            Preventing a Surgical Site Infection:  • For 2 to 3 days before surgery, avoid shaving with a razor because the razor can irritate skin and make it easier to develop an infection.    • Any areas of open skin can increase the risk of a post-operative wound infection by allowing bacteria to enter and travel throughout the body.  Notify your surgeon if you have any skin wounds / rashes even if it is not near the expected surgical site.  The area will need assessed to determine if surgery should be delayed until it is healed.  • The night prior to surgery shower using a fresh bar of anti-bacterial soap (such as Dial) and clean washcloth.  Sleep in a clean bed with clean clothing.  Do not allow pets to sleep with you.  • Shower on the morning of surgery using a fresh bar of anti-bacterial soap (such as Dial) and clean washcloth.  Dry with a clean towel and dress in clean clothing.  • Ask your surgeon if you will be receiving antibiotics prior to surgery.  • Make sure you, your family, and all healthcare providers clean their hands with soap and water or an alcohol based hand  before caring for you or your wound.    Day of surgery:  Your arrival time is approximately two hours before your scheduled surgery time.  Upon arrival, a Pre-op nurse and Anesthesiologist will review your health history, obtain vital signs, and answer questions you may have.  The only belongings needed at this time will be a list of your home medications and if applicable your C-PAP/BI-PAP machine.  If you are staying overnight your family can leave the rest of your belongings in the car and  bring them to your room later.  A Pre-op nurse will start an IV and you may receive medication in preparation for surgery, including something to help you relax.  Your family will be able to see you in the Pre-op area.  Two visitors at a time will be allowed in the Pre-op room.  While you are in surgery your family should notify the waiting room  if they leave the waiting room area and provide a contact phone number.    Please be aware that surgery does come with discomfort.  We want to make every effort to control your discomfort so please discuss any uncontrolled symptoms with your nurse.   Your doctor will most likely have prescribed pain medications.      If you are going home after surgery you will receive individualized written care instructions before being discharged.  A responsible adult must drive you to and from the hospital on the day of your surgery and stay with you for 24 hours.    If you are staying overnight following surgery, you will be transported to your hospital room following the recovery period.  [facility] has all private rooms.    If you have any questions please call Pre-Admission Testing at [phone].  Deductibles and co-payments are collected on the day of service. Please be prepared to pay the required co-pay, deductible or deposit on the day of service as defined by your plan.    Patient Education for Self-Quarantine Process    Following your COVID testing, we strongly recommend that you do not leave your home after you have been tested for COVID except to get medical care. This includes not going to work, school or to public areas.  If this is not possible for you to do please limit your activities to only required outings.  Be sure to wear a mask when you are with other people, practice social distancing and wash your hands frequently.      The following items provide additional details to keep you safe.  • Wash your hands with soap and water frequently  for at least 20 seconds.   • Avoid touching your eyes, nose and mouth with unwashed hands.  • Do not share anything - utensils, towels, food from the same bowl.   • Have your own utensils, drinking glass, dishes, towels and bedding.   • Do not have visitors.   • Do use FaceTime to stay in touch with family and friends.  • You should stay in a specific room away from others if possible.   • Stay at least 6 feet away from others in the home if you cannot have a dedicated room to yourself.   • Do not snuggle with your pet. While the CDC says there is no evidence that pets can spread COVID-19 or be infected from humans, it is probably best to avoid “petting, snuggling, being kissed or licked and sharing food (during self-quarantine)”, according to the CDC.   • Sanitize household surfaces daily. Include all high touch areas (door handles, light switches, phones, countertops, etc.)  • Do not share a bathroom with others, if possible.   • Wear a mask around others in your home if you are unable to stay in a separate room or 6 feet apart. If  you are unable to wear a mask, have your family member wear a mask if they must be within 6 feet of you.   Call your surgeon immediately if you experience any of the following symptoms:  • Sore Throat  • Shortness of Breath or difficulty breathing  • Cough  • Chills  • Body soreness or muscle pain  • Headache  • Fever  • New loss of taste or smell  • Do not arrive for your surgery ill.  Your procedure will need to be rescheduled to another time.  You will need to call your physician before the day of surgery to avoid any unnecessary exposure to hospital staff as well as other patients.

## 2020-09-14 ENCOUNTER — OFFICE VISIT (OUTPATIENT)
Dept: ORTHOPEDIC SURGERY | Facility: CLINIC | Age: 65
End: 2020-09-14

## 2020-09-14 VITALS — WEIGHT: 172.4 LBS | TEMPERATURE: 96.9 F | HEIGHT: 69 IN | BODY MASS INDEX: 25.53 KG/M2

## 2020-09-14 DIAGNOSIS — Z01.818 PREOP EXAMINATION: Primary | ICD-10-CM

## 2020-09-14 PROCEDURE — S0260 H&P FOR SURGERY: HCPCS | Performed by: NURSE PRACTITIONER

## 2020-09-14 NOTE — H&P (VIEW-ONLY)
History & Physical         Patient: Yenny Iglesias    YOB: 1955    Medical Record Number: [id number]    Chief Complaints:   Chief Complaint   Patient presents with   • Right Shoulder - Pre-op Exam     History of Present Illness: 65 y.o. female comes in today of upcoming shoulder replacement surgery. Reports a long history of progressively worsening pain, motion loss, and dysfunction.  Describes current pain as severe.  Has failed prolonged conservative treatment.  Denies any new complaints or issues.    Allergies: No Known Allergies    Medications:   Home Medications:    Current Outpatient Medications:   •  acetaminophen (TYLENOL) 325 MG tablet, Take 650 mg by mouth Every 6 (Six) Hours As Needed for Mild Pain ., Disp: , Rfl:   •  atorvastatin (LIPITOR) 40 MG tablet, Take 40 mg by mouth Daily., Disp: , Rfl:   •  budesonide-formoterol (SYMBICORT) 160-4.5 MCG/ACT inhaler, Inhale 2 puffs Daily., Disp: , Rfl:   •  metoprolol succinate XL (TOPROL-XL) 50 MG 24 hr tablet, Take 50 mg by mouth Every Night., Disp: , Rfl:   •  mupirocin (BACTROBAN) 2 % nasal ointment, into the nostril(s) as directed by provider 2 (Two) Times a Day. PRIOR TO OR, Disp: , Rfl:   •  PREMPRO 0.45-1.5 MG per tablet, Take 1 tablet by mouth Every Night., Disp: , Rfl:   •  temazepam (RESTORIL) 30 MG capsule, Take 15 mg by mouth Every Night., Disp: , Rfl: 2  •  Tiotropium Bromide Monohydrate (SPIRIVA RESPIMAT IN), Inhale 25 mcg Daily., Disp: , Rfl:   •  valsartan-hydrochlorothiazide (DIOVAN-HCT) 160-25 MG per tablet, Take 1 tablet by mouth Daily., Disp: , Rfl:   •  albuterol (PROAIR HFA) 108 (90 BASE) MCG/ACT inhaler, Inhale 2 puffs Every 4 (Four) Hours As Needed., Disp: , Rfl:   •  aspirin (aspirin) 81 MG EC tablet, Take 81 mg by mouth Daily. HELD FOR OR, Disp: , Rfl:   •  cetirizine (zyrTEC) 10 MG tablet, Take 10 mg by mouth Daily., Disp: , Rfl:   •  Chlorhexidine Gluconate Cloth 2 % pads, Apply  topically. PRIOR TO OR, Disp: , Rfl:  "  •  Cholecalciferol (VITAMIN D) 50 MCG (2000 UT) tablet, Take 2,000 Units by mouth Daily., Disp: , Rfl:   •  Ibuprofen 200 MG capsule, Take 200 mg by mouth 2 (two) times a day. HELD FOR OR, Disp: , Rfl:   •  Probiotic Product (ALIGN) 4 MG capsule, Take 1 tablet by mouth Daily., Disp: , Rfl:   No current facility-administered medications for this visit.     Facility-Administered Medications Ordered in Other Visits:   •  Chlorhexidine Gluconate 2 % pads 1 each, 1 each, Apply externally, Take As Directed, Jus Valdez MD    Past Medical History:   Diagnosis Date   • Arthritis    • Asthma    • Chest pain     SEEN BY PCP 2020  - [facility] EVERYWHERE, STATED \"DID NOT WANT TO SEE CARDIO OR GET STRESS TEST\" IN PCP NOTE    • COPD (chronic obstructive pulmonary disease) (CMS/[facility])    • Dyspnea    • Hyperlipidemia    • Hypertension    • IBS (irritable bowel syndrome)    • On home oxygen therapy     3L NC PRN         Past Surgical History:   Procedure Laterality Date   • ANKLE HARDWARE REMOVAL Left 2008   • BACK SURGERY  2008   • LUMBAR DISCECTOMY     • ORIF ANKLE FRACTURE Left    • ROTATOR CUFF REPAIR Right 10/20/2014        Social History     Occupational History   • Not on file   Tobacco Use   • Smoking status: Former Smoker     Packs/day: 1.00     Years: 20.00     Pack years: 20.00     Types: Cigarettes     Quit date: 1998     Years since quittin.2   Substance and Sexual Activity   • Alcohol use: Yes     Comment: 4 TIMES/ WEEK    • Drug use: Never   • Sexual activity: Defer      Social History     Social History Narrative   • Not on file        Family History   Problem Relation Age of Onset   • Hypertension Other    • Diabetes Other    • Heart disease Other    • Malig Hyperthermia Neg Hx      Review of Systems:     Constitutional:  Denies fever, shaking or chills   Eyes:  Denies change in visual acuity   HEENT:  Denies nasal congestion or sore throat   Respiratory:  Denies cough or " "shortness of breath   Cardiovascular:  Denies chest pain or edema   GI:  Denies abdominal pain, nausea, vomiting, bloody stools or diarrhea   Musculoskeletal:  Denies numbness tingling or loss of motor function except as outlined above in history of present illness.  Integument:  Denies rash, lesion or ulceration   Neurologic:  Denies headache or focal weakness, deficits      All other pertinent positives and negatives as noted above in HPI.    Physical Exam: 65 y.o. female    Vitals:    09/14/20 1407   Temp: 96.9 °F (36.1 °C)   Weight: 78.2 kg (172 lb 6.4 oz)   Height: 175.3 cm (69\")     General:  Patient is awake and alert.  Appears in no acute distress or discomfort.    Psych:  Affect and demeanor are appropriate.    Eyes:  Conjunctiva and sclera appear grossly normal.  Eyes track well and EOM seem to be intact.    Ears:  No gross abnormalities.  Hearing adequate for the exam.    Cardiovascular:  Regular rate and rhythm.    Lungs:  Good chest expansion.  Breathing unlabored.    Lymph:  No palpable adenopathy about neck or axilla.    Neck:  Supple.  Normal ROM.  Negative Spurling's for shoulder or arm pain.    Right upper extremity:  Skin benign and intact without evidence for swelling, masses or atrophy.  No palpable masses.  Moderate anterior tenderness.  Shoulder ROM limited and uncomfortable--160° FE, 60° ER, IR to L4.  No evident instability or apprehension.  Weakness with elevation in the scapular plane and external rotation.  Deltoid fires on exam.  Good strength in the lower arm and hand.  Intact sensation throughout the arm and hand palpable radial pulse with brisk cap refill.    Diagnostic Tests:  Lab Results   Component Value Date    GLUCOSE 117 (H) 09/10/2020    CALCIUM 9.3 09/10/2020     09/10/2020    K 3.3 (L) 09/10/2020    CO2 24.7 09/10/2020     09/10/2020    BUN 9 09/10/2020    CREATININE 0.54 (L) 09/10/2020    EGFRIFAFRI 136 09/10/2020    EGFRIFNONA 113 09/10/2020    BCR 16.7 " 09/10/2020    ANIONGAP 10.3 09/10/2020     Lab Results   Component Value Date    WBC 6.94 09/10/2020    HGB 13.0 09/10/2020    HCT 38.1 09/10/2020    MCV 88.8 09/10/2020     09/10/2020     No results found for: INR, PROTIME    Imaging:  Previous x-rays of the shoulder are reviewed.  She has moderate glenohumeral arthritis and a slightly diminished acromiohumeral interval consistent with chronic rotator cuff tear.    Assessment:  Right shoulder rotator cuff tear arthropathy    Plan: We had a thorough discussion regarding the risks, benefits and alternatives to an arthroplasty and non-surgical management versus surgery.  I explained that surgical risks include infection, hematoma, hardware related complications including failure of fixation, loosening, fracture, persistent pain and/or loss of motion, iatrogenic nerve and/or blood vessel injury resulting in permanent weakness, numbness or dysfunction, DVT, PE, positioning related neuropraxia, and anesthesia related complications resulting in death.  We discussed the indication for a reverse as opposed to a standard total shoulder and the risks inherent to the reverse including notching, glenoid loosening, instability, and traction related neuropraxia, any of which could result in persistent pain or problems requiring further surgery.  Lastly, we discussed the rehab and all that will be expected of the patient post operatively to ensure an optimal outcome.  The patient voiced understanding of the risks, benefits, and alternative forms of treatment that were discussed and the patient consents to proceed with the reverse arthroplasty.      Keira Arceo, PJ  09/14/20

## 2020-09-14 NOTE — PROGRESS NOTES
History & Physical         Patient: Yenny Iglesias    YOB: 1955    Medical Record Number: [id number]    Chief Complaints:   Chief Complaint   Patient presents with   • Right Shoulder - Pre-op Exam     History of Present Illness: 65 y.o. female comes in today of upcoming shoulder replacement surgery. Reports a long history of progressively worsening pain, motion loss, and dysfunction.  Describes current pain as severe.  Has failed prolonged conservative treatment.  Denies any new complaints or issues.    Allergies: No Known Allergies    Medications:   Home Medications:    Current Outpatient Medications:   •  acetaminophen (TYLENOL) 325 MG tablet, Take 650 mg by mouth Every 6 (Six) Hours As Needed for Mild Pain ., Disp: , Rfl:   •  atorvastatin (LIPITOR) 40 MG tablet, Take 40 mg by mouth Daily., Disp: , Rfl:   •  budesonide-formoterol (SYMBICORT) 160-4.5 MCG/ACT inhaler, Inhale 2 puffs Daily., Disp: , Rfl:   •  metoprolol succinate XL (TOPROL-XL) 50 MG 24 hr tablet, Take 50 mg by mouth Every Night., Disp: , Rfl:   •  mupirocin (BACTROBAN) 2 % nasal ointment, into the nostril(s) as directed by provider 2 (Two) Times a Day. PRIOR TO OR, Disp: , Rfl:   •  PREMPRO 0.45-1.5 MG per tablet, Take 1 tablet by mouth Every Night., Disp: , Rfl:   •  temazepam (RESTORIL) 30 MG capsule, Take 15 mg by mouth Every Night., Disp: , Rfl: 2  •  Tiotropium Bromide Monohydrate (SPIRIVA RESPIMAT IN), Inhale 25 mcg Daily., Disp: , Rfl:   •  valsartan-hydrochlorothiazide (DIOVAN-HCT) 160-25 MG per tablet, Take 1 tablet by mouth Daily., Disp: , Rfl:   •  albuterol (PROAIR HFA) 108 (90 BASE) MCG/ACT inhaler, Inhale 2 puffs Every 4 (Four) Hours As Needed., Disp: , Rfl:   •  aspirin (aspirin) 81 MG EC tablet, Take 81 mg by mouth Daily. HELD FOR OR, Disp: , Rfl:   •  cetirizine (zyrTEC) 10 MG tablet, Take 10 mg by mouth Daily., Disp: , Rfl:   •  Chlorhexidine Gluconate Cloth 2 % pads, Apply  topically. PRIOR TO OR, Disp: , Rfl:  "  •  Cholecalciferol (VITAMIN D) 50 MCG (2000 UT) tablet, Take 2,000 Units by mouth Daily., Disp: , Rfl:   •  Ibuprofen 200 MG capsule, Take 200 mg by mouth 2 (two) times a day. HELD FOR OR, Disp: , Rfl:   •  Probiotic Product (ALIGN) 4 MG capsule, Take 1 tablet by mouth Daily., Disp: , Rfl:   No current facility-administered medications for this visit.     Facility-Administered Medications Ordered in Other Visits:   •  Chlorhexidine Gluconate 2 % pads 1 each, 1 each, Apply externally, Take As Directed, Jus Valdez MD    Past Medical History:   Diagnosis Date   • Arthritis    • Asthma    • Chest pain     SEEN BY PCP 2020  - [facility] EVERYWHERE, STATED \"DID NOT WANT TO SEE CARDIO OR GET STRESS TEST\" IN PCP NOTE    • COPD (chronic obstructive pulmonary disease) (CMS/[facility])    • Dyspnea    • Hyperlipidemia    • Hypertension    • IBS (irritable bowel syndrome)    • On home oxygen therapy     3L NC PRN         Past Surgical History:   Procedure Laterality Date   • ANKLE HARDWARE REMOVAL Left 2008   • BACK SURGERY  2008   • LUMBAR DISCECTOMY     • ORIF ANKLE FRACTURE Left    • ROTATOR CUFF REPAIR Right 10/20/2014        Social History     Occupational History   • Not on file   Tobacco Use   • Smoking status: Former Smoker     Packs/day: 1.00     Years: 20.00     Pack years: 20.00     Types: Cigarettes     Quit date: 1998     Years since quittin.2   Substance and Sexual Activity   • Alcohol use: Yes     Comment: 4 TIMES/ WEEK    • Drug use: Never   • Sexual activity: Defer      Social History     Social History Narrative   • Not on file        Family History   Problem Relation Age of Onset   • Hypertension Other    • Diabetes Other    • Heart disease Other    • Malig Hyperthermia Neg Hx      Review of Systems:     Constitutional:  Denies fever, shaking or chills   Eyes:  Denies change in visual acuity   HEENT:  Denies nasal congestion or sore throat   Respiratory:  Denies cough or " "shortness of breath   Cardiovascular:  Denies chest pain or edema   GI:  Denies abdominal pain, nausea, vomiting, bloody stools or diarrhea   Musculoskeletal:  Denies numbness tingling or loss of motor function except as outlined above in history of present illness.  Integument:  Denies rash, lesion or ulceration   Neurologic:  Denies headache or focal weakness, deficits      All other pertinent positives and negatives as noted above in HPI.    Physical Exam: 65 y.o. female    Vitals:    09/14/20 1407   Temp: 96.9 °F (36.1 °C)   Weight: 78.2 kg (172 lb 6.4 oz)   Height: 175.3 cm (69\")     General:  Patient is awake and alert.  Appears in no acute distress or discomfort.    Psych:  Affect and demeanor are appropriate.    Eyes:  Conjunctiva and sclera appear grossly normal.  Eyes track well and EOM seem to be intact.    Ears:  No gross abnormalities.  Hearing adequate for the exam.    Cardiovascular:  Regular rate and rhythm.    Lungs:  Good chest expansion.  Breathing unlabored.    Lymph:  No palpable adenopathy about neck or axilla.    Neck:  Supple.  Normal ROM.  Negative Spurling's for shoulder or arm pain.    Right upper extremity:  Skin benign and intact without evidence for swelling, masses or atrophy.  No palpable masses.  Moderate anterior tenderness.  Shoulder ROM limited and uncomfortable--160° FE, 60° ER, IR to L4.  No evident instability or apprehension.  Weakness with elevation in the scapular plane and external rotation.  Deltoid fires on exam.  Good strength in the lower arm and hand.  Intact sensation throughout the arm and hand palpable radial pulse with brisk cap refill.    Diagnostic Tests:  Lab Results   Component Value Date    GLUCOSE 117 (H) 09/10/2020    CALCIUM 9.3 09/10/2020     09/10/2020    K 3.3 (L) 09/10/2020    CO2 24.7 09/10/2020     09/10/2020    BUN 9 09/10/2020    CREATININE 0.54 (L) 09/10/2020    EGFRIFAFRI 136 09/10/2020    EGFRIFNONA 113 09/10/2020    BCR 16.7 " 09/10/2020    ANIONGAP 10.3 09/10/2020     Lab Results   Component Value Date    WBC 6.94 09/10/2020    HGB 13.0 09/10/2020    HCT 38.1 09/10/2020    MCV 88.8 09/10/2020     09/10/2020     No results found for: INR, PROTIME    Imaging:  Previous x-rays of the shoulder are reviewed.  She has moderate glenohumeral arthritis and a slightly diminished acromiohumeral interval consistent with chronic rotator cuff tear.    Assessment:  Right shoulder rotator cuff tear arthropathy    Plan: We had a thorough discussion regarding the risks, benefits and alternatives to an arthroplasty and non-surgical management versus surgery.  I explained that surgical risks include infection, hematoma, hardware related complications including failure of fixation, loosening, fracture, persistent pain and/or loss of motion, iatrogenic nerve and/or blood vessel injury resulting in permanent weakness, numbness or dysfunction, DVT, PE, positioning related neuropraxia, and anesthesia related complications resulting in death.  We discussed the indication for a reverse as opposed to a standard total shoulder and the risks inherent to the reverse including notching, glenoid loosening, instability, and traction related neuropraxia, any of which could result in persistent pain or problems requiring further surgery.  Lastly, we discussed the rehab and all that will be expected of the patient post operatively to ensure an optimal outcome.  The patient voiced understanding of the risks, benefits, and alternative forms of treatment that were discussed and the patient consents to proceed with the reverse arthroplasty.      Keira Arceo, PJ  09/14/20

## 2020-09-15 ENCOUNTER — APPOINTMENT (OUTPATIENT)
Dept: LAB | Facility: HOSPITAL | Age: 65
End: 2020-09-15

## 2020-09-15 ENCOUNTER — LAB (OUTPATIENT)
Dept: LAB | Facility: HOSPITAL | Age: 65
End: 2020-09-15

## 2020-09-15 DIAGNOSIS — Z01.818 OTHER SPECIFIED PRE-OPERATIVE EXAMINATION: ICD-10-CM

## 2020-09-15 PROCEDURE — U0004 COV-19 TEST NON-CDC HGH THRU: HCPCS

## 2020-09-15 PROCEDURE — C9803 HOPD COVID-19 SPEC COLLECT: HCPCS

## 2020-09-16 ENCOUNTER — PREP FOR SURGERY (OUTPATIENT)
Dept: OTHER | Facility: HOSPITAL | Age: 65
End: 2020-09-16

## 2020-09-16 LAB — SARS-COV-2 RNA RESP QL NAA+PROBE: NOT DETECTED

## 2020-09-17 ENCOUNTER — ANESTHESIA EVENT (OUTPATIENT)
Dept: PERIOP | Facility: HOSPITAL | Age: 65
End: 2020-09-17

## 2020-09-17 ENCOUNTER — HOSPITAL ENCOUNTER (INPATIENT)
Facility: HOSPITAL | Age: 65
LOS: 1 days | Discharge: HOME | End: 2020-09-18
Attending: ORTHOPAEDIC SURGERY | Admitting: ORTHOPAEDIC SURGERY

## 2020-09-17 ENCOUNTER — APPOINTMENT (OUTPATIENT)
Dept: GENERAL RADIOLOGY | Facility: HOSPITAL | Age: 65
End: 2020-09-17

## 2020-09-17 ENCOUNTER — ANESTHESIA (OUTPATIENT)
Dept: PERIOP | Facility: HOSPITAL | Age: 65
End: 2020-09-17

## 2020-09-17 DIAGNOSIS — M75.121 COMPLETE TEAR OF RIGHT ROTATOR CUFF: ICD-10-CM

## 2020-09-17 DIAGNOSIS — Z96.611 S/P REVERSE TOTAL SHOULDER ARTHROPLASTY, RIGHT: Primary | ICD-10-CM

## 2020-09-17 LAB — POTASSIUM SERPL-SCNC: 4 MMOL/L (ref 3.5–5.2)

## 2020-09-17 PROCEDURE — 0RRJ00Z REPLACEMENT OF RIGHT SHOULDER JOINT WITH REVERSE BALL AND SOCKET SYNTHETIC SUBSTITUTE, OPEN APPROACH: ICD-10-PCS | Performed by: ORTHOPAEDIC SURGERY

## 2020-09-17 PROCEDURE — 25010000002 VANCOMYCIN 10 G RECONSTITUTED SOLUTION: Performed by: ORTHOPAEDIC SURGERY

## 2020-09-17 PROCEDURE — 84132 ASSAY OF SERUM POTASSIUM: CPT | Performed by: NURSE PRACTITIONER

## 2020-09-17 PROCEDURE — C1776 JOINT DEVICE (IMPLANTABLE): HCPCS | Performed by: ORTHOPAEDIC SURGERY

## 2020-09-17 PROCEDURE — 25010000002 MIDAZOLAM PER 1 MG: Performed by: ANESTHESIOLOGY

## 2020-09-17 PROCEDURE — 23472 RECONSTRUCT SHOULDER JOINT: CPT | Performed by: ORTHOPAEDIC SURGERY

## 2020-09-17 PROCEDURE — 25010000002 DEXAMETHASONE PER 1 MG: Performed by: NURSE ANESTHETIST, CERTIFIED REGISTERED

## 2020-09-17 PROCEDURE — 25010000003 CEFAZOLIN IN DEXTROSE 2-4 GM/100ML-% SOLUTION: Performed by: ORTHOPAEDIC SURGERY

## 2020-09-17 PROCEDURE — 25010000002 FENTANYL CITRATE (PF) 100 MCG/2ML SOLUTION: Performed by: ANESTHESIOLOGY

## 2020-09-17 PROCEDURE — 25010000003 BUPIVACAINE LIPOSOME 1.3 % SUSPENSION: Performed by: ANESTHESIOLOGY

## 2020-09-17 PROCEDURE — 25010000002 HYDROMORPHONE PER 4 MG: Performed by: NURSE ANESTHETIST, CERTIFIED REGISTERED

## 2020-09-17 PROCEDURE — C9290 INJ, BUPIVACAINE LIPOSOME: HCPCS | Performed by: ANESTHESIOLOGY

## 2020-09-17 PROCEDURE — 73020 X-RAY EXAM OF SHOULDER: CPT

## 2020-09-17 PROCEDURE — 25010000002 NEOSTIGMINE PER 0.5 MG: Performed by: NURSE ANESTHETIST, CERTIFIED REGISTERED

## 2020-09-17 PROCEDURE — 25010000002 ONDANSETRON PER 1 MG: Performed by: NURSE ANESTHETIST, CERTIFIED REGISTERED

## 2020-09-17 PROCEDURE — 25010000002 FENTANYL CITRATE (PF) 100 MCG/2ML SOLUTION: Performed by: NURSE ANESTHETIST, CERTIFIED REGISTERED

## 2020-09-17 PROCEDURE — 25010000002 VANCOMYCIN PER 500 MG: Performed by: ORTHOPAEDIC SURGERY

## 2020-09-17 PROCEDURE — 25010000002 PROPOFOL 10 MG/ML EMULSION: Performed by: NURSE ANESTHETIST, CERTIFIED REGISTERED

## 2020-09-17 DEVICE — GLENOSPHERE VERSA DIAL FXD OFFST36 PLS3: Type: IMPLANTABLE DEVICE | Site: SHOULDER | Status: FUNCTIONAL

## 2020-09-17 DEVICE — STEM HUM/SHLDR COMPREHENSIVE MINI 12X83MM: Type: IMPLANTABLE DEVICE | Site: SHOULDER | Status: FUNCTIONAL

## 2020-09-17 DEVICE — TRY HUM/SHLDR COMPREHENSIVE/REVERSE MINI COCR STD 40MM: Type: IMPLANTABLE DEVICE | Site: SHOULDER | Status: FUNCTIONAL

## 2020-09-17 DEVICE — IMPLANTABLE DEVICE: Type: IMPLANTABLE DEVICE | Site: SHOULDER | Status: FUNCTIONAL

## 2020-09-17 DEVICE — SCRW COMPRNSV CNTRL 6.5X35MM REUS: Type: IMPLANTABLE DEVICE | Site: SHOULDER | Status: FUNCTIONAL

## 2020-09-17 DEVICE — DEV CONTRL TISS STRATAFIXSPIRALMNCRYL PLSPS2 REV3/0 15CM: Type: IMPLANTABLE DEVICE | Site: SHOULDER | Status: FUNCTIONAL

## 2020-09-17 DEVICE — SCRW FIX LK HEX 4.75X20MM: Type: IMPLANTABLE DEVICE | Site: SHOULDER | Status: FUNCTIONAL

## 2020-09-17 DEVICE — SCRW FIX LK HEX 4.75X15MM: Type: IMPLANTABLE DEVICE | Site: SHOULDER | Status: FUNCTIONAL

## 2020-09-17 DEVICE — BASEPLT GLEN COMPR MINI W TPR ADAPTR 25: Type: IMPLANTABLE DEVICE | Site: SHOULDER | Status: FUNCTIONAL

## 2020-09-17 DEVICE — BEAR HUM PROLNG STD 36MM: Type: IMPLANTABLE DEVICE | Site: SHOULDER | Status: FUNCTIONAL

## 2020-09-17 DEVICE — SCRW FIX LK HEX 4.75X30MM: Type: IMPLANTABLE DEVICE | Site: SHOULDER | Status: FUNCTIONAL

## 2020-09-17 RX ORDER — MEPERIDINE HYDROCHLORIDE 25 MG/ML
12.5 INJECTION INTRAMUSCULAR; INTRAVENOUS; SUBCUTANEOUS
Status: DISCONTINUED | OUTPATIENT
Start: 2020-09-17 | End: 2020-09-17 | Stop reason: HOSPADM

## 2020-09-17 RX ORDER — PREGABALIN 75 MG/1
150 CAPSULE ORAL ONCE
Status: COMPLETED | OUTPATIENT
Start: 2020-09-17 | End: 2020-09-17

## 2020-09-17 RX ORDER — EPHEDRINE SULFATE 50 MG/ML
5 INJECTION, SOLUTION INTRAVENOUS ONCE AS NEEDED
Status: DISCONTINUED | OUTPATIENT
Start: 2020-09-17 | End: 2020-09-17 | Stop reason: HOSPADM

## 2020-09-17 RX ORDER — PROPOFOL 10 MG/ML
VIAL (ML) INTRAVENOUS AS NEEDED
Status: DISCONTINUED | OUTPATIENT
Start: 2020-09-17 | End: 2020-09-17 | Stop reason: SURG

## 2020-09-17 RX ORDER — NALOXONE HCL 0.4 MG/ML
0.2 VIAL (ML) INJECTION AS NEEDED
Status: DISCONTINUED | OUTPATIENT
Start: 2020-09-17 | End: 2020-09-17 | Stop reason: HOSPADM

## 2020-09-17 RX ORDER — FAMOTIDINE 10 MG/ML
20 INJECTION, SOLUTION INTRAVENOUS ONCE
Status: DISCONTINUED | OUTPATIENT
Start: 2020-09-17 | End: 2020-09-18 | Stop reason: HOSPADM

## 2020-09-17 RX ORDER — DEXAMETHASONE SODIUM PHOSPHATE 10 MG/ML
INJECTION INTRAMUSCULAR; INTRAVENOUS AS NEEDED
Status: DISCONTINUED | OUTPATIENT
Start: 2020-09-17 | End: 2020-09-17 | Stop reason: SURG

## 2020-09-17 RX ORDER — ATORVASTATIN CALCIUM 20 MG/1
40 TABLET, FILM COATED ORAL DAILY
Status: DISCONTINUED | OUTPATIENT
Start: 2020-09-17 | End: 2020-09-18 | Stop reason: HOSPADM

## 2020-09-17 RX ORDER — VANCOMYCIN HYDROCHLORIDE 1 G/200ML
1000 INJECTION, SOLUTION INTRAVENOUS ONCE
Status: COMPLETED | OUTPATIENT
Start: 2020-09-17 | End: 2020-09-17

## 2020-09-17 RX ORDER — ALBUTEROL SULFATE 2.5 MG/3ML
2.5 SOLUTION RESPIRATORY (INHALATION) EVERY 4 HOURS PRN
Status: DISCONTINUED | OUTPATIENT
Start: 2020-09-17 | End: 2020-09-18 | Stop reason: HOSPADM

## 2020-09-17 RX ORDER — DIPHENHYDRAMINE HYDROCHLORIDE 50 MG/ML
12.5 INJECTION INTRAMUSCULAR; INTRAVENOUS
Status: DISCONTINUED | OUTPATIENT
Start: 2020-09-17 | End: 2020-09-17 | Stop reason: HOSPADM

## 2020-09-17 RX ORDER — PROMETHAZINE HYDROCHLORIDE 25 MG/1
25 TABLET ORAL ONCE AS NEEDED
Status: DISCONTINUED | OUTPATIENT
Start: 2020-09-17 | End: 2020-09-17 | Stop reason: HOSPADM

## 2020-09-17 RX ORDER — BUPIVACAINE HYDROCHLORIDE AND EPINEPHRINE 2.5; 5 MG/ML; UG/ML
INJECTION, SOLUTION EPIDURAL; INFILTRATION; INTRACAUDAL; PERINEURAL
Status: COMPLETED | OUTPATIENT
Start: 2020-09-17 | End: 2020-09-17

## 2020-09-17 RX ORDER — FLUMAZENIL 0.1 MG/ML
0.2 INJECTION INTRAVENOUS AS NEEDED
Status: DISCONTINUED | OUTPATIENT
Start: 2020-09-17 | End: 2020-09-17 | Stop reason: HOSPADM

## 2020-09-17 RX ORDER — LIDOCAINE HYDROCHLORIDE 20 MG/ML
INJECTION, SOLUTION INFILTRATION; PERINEURAL AS NEEDED
Status: DISCONTINUED | OUTPATIENT
Start: 2020-09-17 | End: 2020-09-17 | Stop reason: SURG

## 2020-09-17 RX ORDER — SODIUM CHLORIDE 0.9 % (FLUSH) 0.9 %
3 SYRINGE (ML) INJECTION EVERY 12 HOURS SCHEDULED
Status: DISCONTINUED | OUTPATIENT
Start: 2020-09-17 | End: 2020-09-18 | Stop reason: HOSPADM

## 2020-09-17 RX ORDER — ALBUTEROL SULFATE 90 UG/1
2 AEROSOL, METERED RESPIRATORY (INHALATION) EVERY 4 HOURS PRN
Status: DISCONTINUED | OUTPATIENT
Start: 2020-09-17 | End: 2020-09-17 | Stop reason: CLARIF

## 2020-09-17 RX ORDER — HYDROCODONE BITARTRATE AND ACETAMINOPHEN 7.5; 325 MG/1; MG/1
2 TABLET ORAL EVERY 4 HOURS PRN
Status: DISCONTINUED | OUTPATIENT
Start: 2020-09-17 | End: 2020-09-18 | Stop reason: HOSPADM

## 2020-09-17 RX ORDER — BUDESONIDE AND FORMOTEROL FUMARATE DIHYDRATE 160; 4.5 UG/1; UG/1
2 AEROSOL RESPIRATORY (INHALATION)
Status: DISCONTINUED | OUTPATIENT
Start: 2020-09-17 | End: 2020-09-18 | Stop reason: HOSPADM

## 2020-09-17 RX ORDER — GLYCOPYRROLATE 0.2 MG/ML
INJECTION INTRAMUSCULAR; INTRAVENOUS AS NEEDED
Status: DISCONTINUED | OUTPATIENT
Start: 2020-09-17 | End: 2020-09-17 | Stop reason: SURG

## 2020-09-17 RX ORDER — HYDRALAZINE HYDROCHLORIDE 20 MG/ML
5 INJECTION INTRAMUSCULAR; INTRAVENOUS
Status: DISCONTINUED | OUTPATIENT
Start: 2020-09-17 | End: 2020-09-17 | Stop reason: HOSPADM

## 2020-09-17 RX ORDER — TEMAZEPAM 15 MG/1
15 CAPSULE ORAL NIGHTLY
Status: DISCONTINUED | OUTPATIENT
Start: 2020-09-17 | End: 2020-09-18 | Stop reason: HOSPADM

## 2020-09-17 RX ORDER — ACETAMINOPHEN 325 MG/1
650 TABLET ORAL EVERY 4 HOURS PRN
Status: DISCONTINUED | OUTPATIENT
Start: 2020-09-17 | End: 2020-09-18 | Stop reason: HOSPADM

## 2020-09-17 RX ORDER — FENTANYL CITRATE 50 UG/ML
INJECTION, SOLUTION INTRAMUSCULAR; INTRAVENOUS AS NEEDED
Status: DISCONTINUED | OUTPATIENT
Start: 2020-09-17 | End: 2020-09-17 | Stop reason: SURG

## 2020-09-17 RX ORDER — MAGNESIUM HYDROXIDE 1200 MG/15ML
LIQUID ORAL AS NEEDED
Status: DISCONTINUED | OUTPATIENT
Start: 2020-09-17 | End: 2020-09-17 | Stop reason: HOSPADM

## 2020-09-17 RX ORDER — FENTANYL CITRATE 50 UG/ML
50 INJECTION, SOLUTION INTRAMUSCULAR; INTRAVENOUS
Status: DISCONTINUED | OUTPATIENT
Start: 2020-09-17 | End: 2020-09-18 | Stop reason: HOSPADM

## 2020-09-17 RX ORDER — FAMOTIDINE 10 MG/ML
20 INJECTION, SOLUTION INTRAVENOUS ONCE
Status: COMPLETED | OUTPATIENT
Start: 2020-09-17 | End: 2020-09-17

## 2020-09-17 RX ORDER — MIDAZOLAM HYDROCHLORIDE 1 MG/ML
INJECTION INTRAMUSCULAR; INTRAVENOUS
Status: COMPLETED | OUTPATIENT
Start: 2020-09-17 | End: 2020-09-17

## 2020-09-17 RX ORDER — SODIUM CHLORIDE 0.9 % (FLUSH) 0.9 %
10 SYRINGE (ML) INJECTION AS NEEDED
Status: DISCONTINUED | OUTPATIENT
Start: 2020-09-17 | End: 2020-09-18 | Stop reason: HOSPADM

## 2020-09-17 RX ORDER — DIPHENHYDRAMINE HCL 25 MG
25 CAPSULE ORAL
Status: DISCONTINUED | OUTPATIENT
Start: 2020-09-17 | End: 2020-09-17 | Stop reason: HOSPADM

## 2020-09-17 RX ORDER — PROMETHAZINE HYDROCHLORIDE 25 MG/1
25 SUPPOSITORY RECTAL ONCE AS NEEDED
Status: DISCONTINUED | OUTPATIENT
Start: 2020-09-17 | End: 2020-09-17 | Stop reason: HOSPADM

## 2020-09-17 RX ORDER — METOPROLOL SUCCINATE 50 MG/1
50 TABLET, EXTENDED RELEASE ORAL NIGHTLY
Status: DISCONTINUED | OUTPATIENT
Start: 2020-09-17 | End: 2020-09-18 | Stop reason: HOSPADM

## 2020-09-17 RX ORDER — MELOXICAM 15 MG/1
15 TABLET ORAL DAILY
Status: DISCONTINUED | OUTPATIENT
Start: 2020-09-17 | End: 2020-09-18 | Stop reason: HOSPADM

## 2020-09-17 RX ORDER — LABETALOL HYDROCHLORIDE 5 MG/ML
5 INJECTION, SOLUTION INTRAVENOUS
Status: DISCONTINUED | OUTPATIENT
Start: 2020-09-17 | End: 2020-09-17 | Stop reason: HOSPADM

## 2020-09-17 RX ORDER — MELOXICAM 15 MG/1
15 TABLET ORAL ONCE
Status: COMPLETED | OUTPATIENT
Start: 2020-09-17 | End: 2020-09-17

## 2020-09-17 RX ORDER — DOCUSATE SODIUM 100 MG/1
100 CAPSULE, LIQUID FILLED ORAL 2 TIMES DAILY PRN
Status: DISCONTINUED | OUTPATIENT
Start: 2020-09-17 | End: 2020-09-18 | Stop reason: HOSPADM

## 2020-09-17 RX ORDER — HYDROCODONE BITARTRATE AND ACETAMINOPHEN 7.5; 325 MG/1; MG/1
1 TABLET ORAL ONCE AS NEEDED
Status: DISCONTINUED | OUTPATIENT
Start: 2020-09-17 | End: 2020-09-17 | Stop reason: HOSPADM

## 2020-09-17 RX ORDER — HYDROMORPHONE HYDROCHLORIDE 1 MG/ML
0.5 INJECTION, SOLUTION INTRAMUSCULAR; INTRAVENOUS; SUBCUTANEOUS
Status: DISCONTINUED | OUTPATIENT
Start: 2020-09-17 | End: 2020-09-18 | Stop reason: HOSPADM

## 2020-09-17 RX ORDER — EPHEDRINE SULFATE 50 MG/ML
INJECTION, SOLUTION INTRAVENOUS AS NEEDED
Status: DISCONTINUED | OUTPATIENT
Start: 2020-09-17 | End: 2020-09-17 | Stop reason: SURG

## 2020-09-17 RX ORDER — ONDANSETRON 4 MG/1
4 TABLET, FILM COATED ORAL EVERY 6 HOURS PRN
Status: DISCONTINUED | OUTPATIENT
Start: 2020-09-17 | End: 2020-09-18 | Stop reason: HOSPADM

## 2020-09-17 RX ORDER — TRANEXAMIC ACID 100 MG/ML
INJECTION, SOLUTION INTRAVENOUS AS NEEDED
Status: DISCONTINUED | OUTPATIENT
Start: 2020-09-17 | End: 2020-09-17 | Stop reason: SURG

## 2020-09-17 RX ORDER — ROCURONIUM BROMIDE 10 MG/ML
INJECTION, SOLUTION INTRAVENOUS AS NEEDED
Status: DISCONTINUED | OUTPATIENT
Start: 2020-09-17 | End: 2020-09-17 | Stop reason: SURG

## 2020-09-17 RX ORDER — SODIUM CHLORIDE, SODIUM LACTATE, POTASSIUM CHLORIDE, CALCIUM CHLORIDE 600; 310; 30; 20 MG/100ML; MG/100ML; MG/100ML; MG/100ML
100 INJECTION, SOLUTION INTRAVENOUS CONTINUOUS
Status: DISCONTINUED | OUTPATIENT
Start: 2020-09-17 | End: 2020-09-18 | Stop reason: HOSPADM

## 2020-09-17 RX ORDER — FENTANYL CITRATE 50 UG/ML
50 INJECTION, SOLUTION INTRAMUSCULAR; INTRAVENOUS
Status: DISCONTINUED | OUTPATIENT
Start: 2020-09-17 | End: 2020-09-17 | Stop reason: HOSPADM

## 2020-09-17 RX ORDER — ONDANSETRON 2 MG/ML
4 INJECTION INTRAMUSCULAR; INTRAVENOUS EVERY 6 HOURS PRN
Status: DISCONTINUED | OUTPATIENT
Start: 2020-09-17 | End: 2020-09-18 | Stop reason: HOSPADM

## 2020-09-17 RX ORDER — NALOXONE HCL 0.4 MG/ML
0.1 VIAL (ML) INJECTION
Status: DISCONTINUED | OUTPATIENT
Start: 2020-09-17 | End: 2020-09-18 | Stop reason: HOSPADM

## 2020-09-17 RX ORDER — HYDROCODONE BITARTRATE AND ACETAMINOPHEN 7.5; 325 MG/1; MG/1
1 TABLET ORAL EVERY 4 HOURS PRN
Status: DISCONTINUED | OUTPATIENT
Start: 2020-09-17 | End: 2020-09-18 | Stop reason: HOSPADM

## 2020-09-17 RX ORDER — SODIUM CHLORIDE, SODIUM LACTATE, POTASSIUM CHLORIDE, CALCIUM CHLORIDE 600; 310; 30; 20 MG/100ML; MG/100ML; MG/100ML; MG/100ML
9 INJECTION, SOLUTION INTRAVENOUS CONTINUOUS
Status: DISCONTINUED | OUTPATIENT
Start: 2020-09-17 | End: 2020-09-18 | Stop reason: HOSPADM

## 2020-09-17 RX ORDER — FENTANYL CITRATE 50 UG/ML
INJECTION, SOLUTION INTRAMUSCULAR; INTRAVENOUS
Status: COMPLETED | OUTPATIENT
Start: 2020-09-17 | End: 2020-09-17

## 2020-09-17 RX ORDER — LIDOCAINE HYDROCHLORIDE 10 MG/ML
0.5 INJECTION, SOLUTION EPIDURAL; INFILTRATION; INTRACAUDAL; PERINEURAL ONCE AS NEEDED
Status: DISCONTINUED | OUTPATIENT
Start: 2020-09-17 | End: 2020-09-18 | Stop reason: HOSPADM

## 2020-09-17 RX ORDER — HYDROMORPHONE HYDROCHLORIDE 1 MG/ML
0.5 INJECTION, SOLUTION INTRAMUSCULAR; INTRAVENOUS; SUBCUTANEOUS
Status: DISCONTINUED | OUTPATIENT
Start: 2020-09-17 | End: 2020-09-17 | Stop reason: HOSPADM

## 2020-09-17 RX ORDER — MIDAZOLAM HYDROCHLORIDE 1 MG/ML
1 INJECTION INTRAMUSCULAR; INTRAVENOUS
Status: DISCONTINUED | OUTPATIENT
Start: 2020-09-17 | End: 2020-09-18 | Stop reason: HOSPADM

## 2020-09-17 RX ORDER — CEFAZOLIN SODIUM 2 G/100ML
2 INJECTION, SOLUTION INTRAVENOUS EVERY 8 HOURS
Status: COMPLETED | OUTPATIENT
Start: 2020-09-17 | End: 2020-09-17

## 2020-09-17 RX ORDER — SODIUM CHLORIDE, SODIUM LACTATE, POTASSIUM CHLORIDE, CALCIUM CHLORIDE 600; 310; 30; 20 MG/100ML; MG/100ML; MG/100ML; MG/100ML
INJECTION, SOLUTION INTRAVENOUS CONTINUOUS PRN
Status: DISCONTINUED | OUTPATIENT
Start: 2020-09-17 | End: 2020-09-17 | Stop reason: SURG

## 2020-09-17 RX ORDER — CEFAZOLIN SODIUM 2 G/100ML
2 INJECTION, SOLUTION INTRAVENOUS ONCE
Status: COMPLETED | OUTPATIENT
Start: 2020-09-17 | End: 2020-09-17

## 2020-09-17 RX ORDER — SODIUM CHLORIDE 0.9 % (FLUSH) 0.9 %
3-10 SYRINGE (ML) INJECTION AS NEEDED
Status: DISCONTINUED | OUTPATIENT
Start: 2020-09-17 | End: 2020-09-18 | Stop reason: HOSPADM

## 2020-09-17 RX ORDER — ACETAMINOPHEN 500 MG
1000 TABLET ORAL ONCE
Status: COMPLETED | OUTPATIENT
Start: 2020-09-17 | End: 2020-09-17

## 2020-09-17 RX ORDER — ONDANSETRON 2 MG/ML
4 INJECTION INTRAMUSCULAR; INTRAVENOUS ONCE AS NEEDED
Status: DISCONTINUED | OUTPATIENT
Start: 2020-09-17 | End: 2020-09-17 | Stop reason: HOSPADM

## 2020-09-17 RX ORDER — ONDANSETRON 2 MG/ML
INJECTION INTRAMUSCULAR; INTRAVENOUS AS NEEDED
Status: DISCONTINUED | OUTPATIENT
Start: 2020-09-17 | End: 2020-09-17 | Stop reason: SURG

## 2020-09-17 RX ORDER — CETIRIZINE HYDROCHLORIDE 10 MG/1
10 TABLET ORAL DAILY
Status: DISCONTINUED | OUTPATIENT
Start: 2020-09-17 | End: 2020-09-18 | Stop reason: HOSPADM

## 2020-09-17 RX ORDER — POLYETHYLENE GLYCOL 3350 17 G/17G
17 POWDER, FOR SOLUTION ORAL DAILY
Status: DISCONTINUED | OUTPATIENT
Start: 2020-09-17 | End: 2020-09-18 | Stop reason: HOSPADM

## 2020-09-17 RX ORDER — OXYCODONE AND ACETAMINOPHEN 7.5; 325 MG/1; MG/1
1 TABLET ORAL ONCE AS NEEDED
Status: COMPLETED | OUTPATIENT
Start: 2020-09-17 | End: 2020-09-17

## 2020-09-17 RX ADMIN — MELOXICAM 15 MG: 15 TABLET ORAL at 06:47

## 2020-09-17 RX ADMIN — NEOSTIGMINE METHYLSULFATE 4 MG: 1 INJECTION INTRAMUSCULAR; INTRAVENOUS; SUBCUTANEOUS at 09:42

## 2020-09-17 RX ADMIN — CEFAZOLIN SODIUM 2 G: 2 INJECTION, SOLUTION INTRAVENOUS at 23:05

## 2020-09-17 RX ADMIN — VANCOMYCIN HYDROCHLORIDE 1250 MG: 10 INJECTION, POWDER, LYOPHILIZED, FOR SOLUTION INTRAVENOUS at 17:52

## 2020-09-17 RX ADMIN — PREGABALIN 150 MG: 75 CAPSULE ORAL at 06:47

## 2020-09-17 RX ADMIN — METOPROLOL SUCCINATE 50 MG: 50 TABLET, EXTENDED RELEASE ORAL at 20:47

## 2020-09-17 RX ADMIN — HYDROCODONE BITARTRATE AND ACETAMINOPHEN 1 TABLET: 7.5; 325 TABLET ORAL at 17:07

## 2020-09-17 RX ADMIN — PROPOFOL 150 MG: 10 INJECTION, EMULSION INTRAVENOUS at 08:29

## 2020-09-17 RX ADMIN — FENTANYL CITRATE 50 MCG: 50 INJECTION INTRAMUSCULAR; INTRAVENOUS at 08:02

## 2020-09-17 RX ADMIN — MUPIROCIN 1 APPLICATION: 20 OINTMENT TOPICAL at 20:47

## 2020-09-17 RX ADMIN — TRANEXAMIC ACID 1000 MG: 100 INJECTION, SOLUTION INTRAVENOUS at 08:58

## 2020-09-17 RX ADMIN — FENTANYL CITRATE 50 MCG: 50 INJECTION, SOLUTION INTRAMUSCULAR; INTRAVENOUS at 11:08

## 2020-09-17 RX ADMIN — SODIUM CHLORIDE, POTASSIUM CHLORIDE, SODIUM LACTATE AND CALCIUM CHLORIDE: 600; 310; 30; 20 INJECTION, SOLUTION INTRAVENOUS at 08:22

## 2020-09-17 RX ADMIN — GLYCOPYRROLATE 0.4 MG: 0.2 INJECTION INTRAMUSCULAR; INTRAVENOUS at 09:42

## 2020-09-17 RX ADMIN — BUPIVACAINE 10 ML: 13.3 INJECTION, SUSPENSION, LIPOSOMAL INFILTRATION at 08:05

## 2020-09-17 RX ADMIN — DEXAMETHASONE SODIUM PHOSPHATE 8 MG: 10 INJECTION INTRAMUSCULAR; INTRAVENOUS at 08:33

## 2020-09-17 RX ADMIN — HYDROMORPHONE HYDROCHLORIDE 0.5 MG: 1 INJECTION, SOLUTION INTRAMUSCULAR; INTRAVENOUS; SUBCUTANEOUS at 10:21

## 2020-09-17 RX ADMIN — FENTANYL CITRATE 50 MCG: 50 INJECTION INTRAMUSCULAR; INTRAVENOUS at 08:04

## 2020-09-17 RX ADMIN — ACETAMINOPHEN 1000 MG: 500 TABLET, FILM COATED ORAL at 06:47

## 2020-09-17 RX ADMIN — ONDANSETRON HYDROCHLORIDE 4 MG: 2 SOLUTION INTRAMUSCULAR; INTRAVENOUS at 09:43

## 2020-09-17 RX ADMIN — MIDAZOLAM 2 MG: 1 INJECTION INTRAMUSCULAR; INTRAVENOUS at 08:04

## 2020-09-17 RX ADMIN — ROCURONIUM BROMIDE 40 MG: 10 INJECTION INTRAVENOUS at 08:29

## 2020-09-17 RX ADMIN — CETIRIZINE HYDROCHLORIDE 10 MG: 10 TABLET ORAL at 17:53

## 2020-09-17 RX ADMIN — LIDOCAINE HYDROCHLORIDE 100 MG: 20 INJECTION, SOLUTION INFILTRATION; PERINEURAL at 08:29

## 2020-09-17 RX ADMIN — FENTANYL CITRATE 100 MCG: 50 INJECTION INTRAMUSCULAR; INTRAVENOUS at 08:23

## 2020-09-17 RX ADMIN — EPHEDRINE SULFATE 10 MG: 50 INJECTION INTRAVENOUS at 09:15

## 2020-09-17 RX ADMIN — VANCOMYCIN HYDROCHLORIDE 1000 MG: 1 INJECTION, SOLUTION INTRAVENOUS at 06:47

## 2020-09-17 RX ADMIN — OXYCODONE HYDROCHLORIDE AND ACETAMINOPHEN 1 TABLET: 7.5; 325 TABLET ORAL at 11:03

## 2020-09-17 RX ADMIN — ATORVASTATIN CALCIUM 40 MG: 20 TABLET, FILM COATED ORAL at 17:52

## 2020-09-17 RX ADMIN — SODIUM CHLORIDE, POTASSIUM CHLORIDE, SODIUM LACTATE AND CALCIUM CHLORIDE 100 ML/HR: 600; 310; 30; 20 INJECTION, SOLUTION INTRAVENOUS at 17:07

## 2020-09-17 RX ADMIN — EPHEDRINE SULFATE 10 MG: 50 INJECTION INTRAVENOUS at 08:46

## 2020-09-17 RX ADMIN — SODIUM CHLORIDE, PRESERVATIVE FREE 3 ML: 5 INJECTION INTRAVENOUS at 20:37

## 2020-09-17 RX ADMIN — HYDROCODONE BITARTRATE AND ACETAMINOPHEN 1 TABLET: 7.5; 325 TABLET ORAL at 23:03

## 2020-09-17 RX ADMIN — FENTANYL CITRATE 50 MCG: 50 INJECTION, SOLUTION INTRAMUSCULAR; INTRAVENOUS at 10:14

## 2020-09-17 RX ADMIN — HYDROCHLOROTHIAZIDE: 12.5 CAPSULE ORAL at 15:23

## 2020-09-17 RX ADMIN — FAMOTIDINE 20 MG: 10 INJECTION INTRAVENOUS at 08:09

## 2020-09-17 RX ADMIN — BUPIVACAINE HYDROCHLORIDE AND EPINEPHRINE 10 ML: 2.5; 5 INJECTION, SOLUTION EPIDURAL; INFILTRATION; INTRACAUDAL; PERINEURAL at 08:05

## 2020-09-17 RX ADMIN — CEFAZOLIN SODIUM 2 G: 2 INJECTION, SOLUTION INTRAVENOUS at 08:24

## 2020-09-17 RX ADMIN — CEFAZOLIN SODIUM 2 G: 2 INJECTION, SOLUTION INTRAVENOUS at 15:23

## 2020-09-17 NOTE — OP NOTE
Orthopaedic Operative Note    Facility: [facility]    Patient: Yenny Iglesias    Medical Record Number: [id number]    YOB: 1955    Dictating Surgeon: Jus Valdez M.D.*    Primary Care Physician: Leopoldo Hopper MD    Date of Operation: 9/17/2020    Pre-Operative Diagnosis:  Right rotator cuff tear arthropathy    Post-Operative Diagnosis:  Right rotator cuff tear arthropathy    Procedure Performed:    Right reverse total shoulder arthroplasty      Surgeon: Jus Valdez MD     Assistant: BRE Carver    Anesthesia: Regional followed by Gen.    Complications: None.     Estimated Blood Loss: Less than 50 mL.     Implants: 1.  Biomet size 12 mm mini comprehensive stem  2.  Mini glenoid baseplate with one 6.5 mm central compression screw and 4 peripheral 4.75 mm locking screws  3.  Size 36 mm +3 offset glenosphere  4.  Standard thickness, standard offset tray with 36 mm standard bearing    Specimens:   Order Name Source Comment Collection Info Order Time   POTASSIUM   Collected By: Norma Villa RN 9/17/2020  6:23 AM       Brief Operative Indication:  Ms. Ilgesias had a history of worsening right shoulder pain and dysfunction which had been nonresponsive to conservative treatment.  We had a thorough discussion regarding the risks, benefits and alternatives to an arthroplasty and non-surgical management versus surgery.  I explained that surgical risks include infection, hematoma, hardware related complications including failure of fixation, loosening, fracture, persistent pain and/or loss of motion, iatrogenic nerve and/or blood vessel injury resulting in permanent weakness, numbness or dysfunction, DVT, PE, positioning related neuropraxia, and anesthesia related complications resulting in death.  We discussed the indication for a reverse as opposed to a standard total shoulder and the risks inherent to the reverse including notching, glenoid loosening,  instability, and traction related neuropraxia, any of which could result in persistent pain or problems requiring further surgery.  Last, we discussed the rehab and all that will be expected of the patient post operatively to ensure an optimal outcome.  The patient voiced understanding of the risks, benefits, and alternative forms of treatment that were discussed and the patient consented to proceed.    Description of the procedure in detail:  The patient and operative site were identified in the preoperative holding area.  The surgical site was marked.  Adequate regional anesthesia was administered.  The patient was then taken to the operating room.  The patient was placed on the operating table where adequate general anesthesia was administered.  The patient was then repositioned into the modified beachchair position with the head and neck in neutral alignment.  All bony prominences were carefully padded and protected.    The right upper extremity was then prepped and draped in the standard, sterile fashion.  The extremity was cleaned with an alcohol solution.  A Hibiclens scrub was performed and then the extremity was prepped with 2 ChloraPrep preps.  I allowed this to dry for 3 minutes before the draping procedure was carried out.    A timeout was taken and preoperative antibiotics administered.  Transexamic acid was administered at this time as well.  Following this, I began by fashioning an approximately 6 cm incision over the anterior aspect of the shoulder.  This was carried down through the skin and subcutaneous tissues.  Full-thickness medial and lateral skin flaps were developed.  The interval between the deltoid and pectoralis was carefully identified and developed.  The underlying cephalic vein was dissected out and retracted laterally.  This structure was kept protected throughout the case.    The clavipectoral fascia was divided.  The strap muscles were retracted medially.  These had scarred to the  anterior humeral head and were carefully mobilized and released.  The biceps had already been addressed in a previous procedure.  The structure was not visualized during the case.  As expected, there was a large, retracted tear of the rotator cuff involving the entirety of the subscapularis and supraspinatus.  There were multiple loose sutures from her previous repair which were removed.  There was moderate arthrosis of the visible portion of the humeral head.  The humeral head was then completely exposed.  The periarticular osteophytes were carefully removed with a rongeur.    The cutting guide for the head cut was inserted.  This was set to 20° of retroversion which I judged off of the forearm.  With the guide in position, I demarcated the cut and then carried out the cut using an oscillating saw.  The cut portion of the bone was removed and taken to the back table for possible bone grafting later in the case, if needed.    Having completed the humeral sided preparations, I then directed my attention to the glenoid.  The axillary nerve was carefully dissected out and identified.  This structure was protected.  Retractors were carefully positioned along the anterior, posterior and inferior glenoid rim.  I carefully exposed the caudal rim of the glenoid using the electrocautery.  The anterior, inferior and posterior glenoid labrum were then carefully debrided and removed along with the remaining biceps stump superiorly.  The centering guide for the baseplate was inserted.  The center pin for the baseplate was drilled in the center of the glenoid vault.  I then carefully reamed and prepared the glenoid in typical fashion, taking care to maintain appropriate inferior tilt for proper positioning of the baseplate.    Once I had completed the reaming process and made sure that the reaming was adequate, the baseplate was impacted into position.  This was secured with a single screw central compression screw which got  great purchase.  The 4 peripheral locking screws were then placed without complication.  All 4 screws were confirmed to lock into the baseplate.  With the baseplate secured, I examined the remaining glenoid.  I did determine that a 36 mm +3 offset glenosphere would fit best in this case.  The appropriate size implant was selected for use and then impacted.  I took care to make sure that the Denny taper was fully engaged and that the implant was well-seated.  I used a right angle clamp to pass this beneath the implant and pull up.  When doing so, the entire scapula moved.  Once I was satisfied that this was well seated, I then directed my attention back to the humerus.    The humeral sided preparations were carried out in the typical fashion.  I reamed and broached the humerus up to a size 12 which seemed to fit well.  The appropriate size implant was impacted into position, taking care to maintain appropriate retroversion as judged off of the forearm.  The implants seated well.  I then trialed off of the stem.  The standard thickness, standard offset tray with a standard thickness liner fit best.  This allowed for excellent motion and stability.  The trial component was removed and then the final component impacted.  Again, I took care to make sure that the Denny taper was fully engaged before reducing it and carrying the shoulder through range of motion.    Again, the shoulder demonstrated excellent motion and stability.  I could fully elevate, abduct and externally rotate the shoulder without any impingement or limitation.  The shoulder demonstrated excellent motion and absolutely no instability.  There was good tension in the periarticular soft tissue structures.  I then irrigated the wound with 500 cc of a Betadine-containing saline solution.  I then irrigated with 3 L of sterile saline via pulsatile lavage.  I made sure that we had good hemostasis.  The wound was sequentially closed in a layered fashion.   Vicryl was used to repair the subcutaneous tissues.  A running subcuticular Monocryl stitch was used to close the skin followed by Dermabond.  Sterile dressings were applied.  The drapes were withdrawn.  The arm was placed in a sling.  The patient was awakened and taken to the recovery room in good condition.    Jus Valdez MD  09/17/20

## 2020-09-17 NOTE — ANESTHESIA POSTPROCEDURE EVALUATION
"Patient: Yenny Iglesias    Procedure Summary     Date: 09/17/20 Room / Location: [facility] OR [address] [place] [address] MAIN OR    Anesthesia Start: 0822 Anesthesia Stop: 0956    Procedure: Reverse Total Shoulder Arthroplasty (Right Shoulder) Diagnosis:       Complete tear of right rotator cuff      (Complete tear of right rotator cuff [M75.121])    Surgeon: Jus Valdez MD Provider: Anthony Nickerson MD    Anesthesia Type: general with block ASA Status: 3          Anesthesia Type: general with block    Vitals  Vitals Value Taken Time   /77 09/17/20 1015   Temp 36.4 °C (97.5 °F) 09/17/20 0953   Pulse 66 09/17/20 1024   Resp 14 09/17/20 1015   SpO2 98 % 09/17/20 1024   Vitals shown include unvalidated device data.        Post Anesthesia Care and Evaluation    Patient location during evaluation: bedside  Patient participation: complete - patient participated  Level of consciousness: awake  Pain management: adequate  Airway patency: patent  Anesthetic complications: No anesthetic complications    Cardiovascular status: acceptable  Respiratory status: acceptable  Hydration status: acceptable    Comments: */77   Pulse 70   Temp 36.4 °C (97.5 °F) (Oral)   Resp 14   Ht 175.3 cm (69\")   Wt 78.1 kg (172 lb 4 oz)   SpO2 99%   BMI 25.44 kg/m²         "

## 2020-09-17 NOTE — BRIEF OP NOTE
TOTAL SHOULDER REVERSE ARTHROPLASTY  Progress Note    Yenny Iglesias  9/17/2020    Pre-op Diagnosis:   Complete tear of right rotator cuff [M75.121]       Post-Op Diagnosis Codes:     * Complete tear of right rotator cuff [M75.121]    Procedure/CPT® Codes:        Procedure(s):  Reverse Total Shoulder Arthroplasty    Surgeon(s):  Jus Valdez MD    Anesthesia: General with Block    Staff:   Circulator: Mary Marino RN  Scrub Person: Garland Miller  Vendor Representative: Franco Iglesias  Assistant: Greg Blount CSA  Assistant: Greg Blount CSA      Estimated Blood Loss: 100ml    Urine Voided: * No values recorded between 9/17/2020  8:12 AM and 9/17/2020  9:33 AM *    Specimens:                None          Drains: * No LDAs found *    Findings: See dictation    Complications: None    Assistant: Greg Blount CSA  was responsible for performing the following activities: Retraction, manipulation of the arm for insertion of the implants, suctioning and irrigation, wound closure and application of the postoperative bandages and their skilled assistance was necessary for the success of this case.    Jus Valdez MD     Date: 9/17/2020  Time: 09:33 EDT

## 2020-09-17 NOTE — ANESTHESIA PROCEDURE NOTES
Peripheral Block    Pre-sedation assessment completed: 9/17/2020 8:00 AM    Patient reassessed immediately prior to procedure    Patient location during procedure: holding area  Start time: 9/17/2020 8:02 AM  Stop time: 9/17/2020 8:05 AM  Reason for block: at surgeon's request, post-op pain management and secondary anesthetic  Performed by  Anesthesiologist: Jeremie Rene MD  Preanesthetic Checklist  Completed: patient identified, surgical consent, pre-op evaluation, timeout performed, IV checked, risks and benefits discussed and monitors and equipment checked  Prep:  Pt Position: supine  Sterile barriers:gloves, mask and sterile barriers  Prep: ChloraPrep  Patient monitoring: blood pressure monitoring, continuous pulse oximetry and EKG  Procedure  Sedation:yes  Performed under: local infiltration  Guidance:ultrasound guided  ULTRASOUND INTERPRETATION.  Using ultrasound guidance a 22 G gauge needle was placed in close proximity to the brachial plexus nerve, at which point, under ultrasound guidance anesthetic was injected in the area of the nerve and spread of the anesthesia was seen on ultrasound in close proximity thereto.  There were no abnormalities seen on ultrasound; a digital image was taken; and the patient tolerated the procedure with no complications. Images:still images obtained, printed/placed on chart    Block Type:interscalene  Injection Technique:single-shot  Needle Type:echogenic  Needle Gauge:22 G  Resistance on Injection: none    Medications Used: bupivacaine liposome (EXPAREL) 1.3 % injection, 10 mL  bupivacaine-EPINEPHrine PF (MARCAINE w/EPI) 0.25% -[id number] injection, 10 mL  Med admintered at 9/17/2020 8:05 AM      Post Assessment  Injection Assessment: negative aspiration for heme, no paresthesia on injection and incremental injection  Patient Tolerance:comfortable throughout block  Complications:no  Additional Notes  Post-Op Diagnosis Codes:     * Complete tear of right rotator cuff  (M75.121)

## 2020-09-17 NOTE — ANESTHESIA PROCEDURE NOTES
Airway  Urgency: elective    Date/Time: 9/17/2020 8:30 AM  Airway not difficult    General Information and Staff    Patient location during procedure: OR  Anesthesiologist: Anthony Nickerson MD  CRNA: Evangelina Jaramillo CRNA    Indications and Patient Condition  Indications for airway management: airway protection    Preoxygenated: yes  MILS not maintained throughout  Mask difficulty assessment: 1 - vent by mask    Final Airway Details  Final airway type: endotracheal airway      Successful airway: ETT  Cuffed: yes   Successful intubation technique: direct laryngoscopy  Endotracheal tube insertion site: oral  Blade: Jony  Blade size: 3  ETT size (mm): 7.0  Cormack-Lehane Classification: grade I - full view of glottis  Placement verified by: chest auscultation and capnometry   Cuff volume (mL): 7  Measured from: lips  ETT/EBT  to lips (cm): 21  Number of attempts at approach: 1  Assessment: lips, teeth, and gum same as pre-op and atraumatic intubation    Additional Comments  Pt preoxygenated prior to induction, easy mask airway, atraumatic intubation,+ ETCO2, + bs bilat,  ETT secured and connected to ventilator.

## 2020-09-17 NOTE — ANESTHESIA PREPROCEDURE EVALUATION
Anesthesia Evaluation     Patient summary reviewed and Nursing notes reviewed   no history of anesthetic complications:               Airway   Mallampati: II  TM distance: >3 FB  Neck ROM: full  no difficulty expected  Dental - normal exam     Pulmonary    (+) COPD severe, asthma,home oxygen (3lnc), shortness of breath, decreased breath sounds,   (-) sleep apnea, wheezes  Cardiovascular - normal exam  Exercise tolerance: good (4-7 METS)    Rhythm: regular  Rate: normal    (+) hypertension, hyperlipidemia,   (-) past MI, angina, CHF, orthopnea, PND, OSWALD, PVD      Neuro/Psych- negative ROS  (-) seizures, neuromuscular disease, TIA, CVA, dizziness/light headedness, weakness, numbness  GI/Hepatic/Renal/Endo - negative ROS   (-) liver disease, diabetes    Musculoskeletal (-) negative ROS    Abdominal  - normal exam   Substance History - negative use  (-) alcohol use, drug use     OB/GYN negative ob/gyn ROS         Other - negative ROS                       Anesthesia Plan    ASA 3     general with block   (I discussed with the patient the relevant risks of general anesthesia including, but not limited to, nausea, vomiting, disorientation, post-op delirium, nerve injury, oral/dental injury, awareness, stroke, and death.  I also reviewed any clinically relevant lab and imaging results.)  intravenous induction     Anesthetic plan, all risks, benefits, and alternatives have been provided, discussed and informed consent has been obtained with: patient.    Plan discussed with CRNA.

## 2020-09-17 NOTE — PLAN OF CARE
Goal Outcome Evaluation:  Plan of Care Reviewed With: patient     Outcome Summary: vitals WNL, no c/o nausea, pain considered tolerable per patient, tolerating PO, Alert and oriented

## 2020-09-18 VITALS
HEART RATE: 94 BPM | RESPIRATION RATE: 16 BRPM | BODY MASS INDEX: 25.51 KG/M2 | HEIGHT: 69 IN | DIASTOLIC BLOOD PRESSURE: 70 MMHG | OXYGEN SATURATION: 97 % | SYSTOLIC BLOOD PRESSURE: 123 MMHG | TEMPERATURE: 97.6 F | WEIGHT: 172.25 LBS

## 2020-09-18 PROCEDURE — 99024 POSTOP FOLLOW-UP VISIT: CPT | Performed by: NURSE PRACTITIONER

## 2020-09-18 PROCEDURE — 90686 IIV4 VACC NO PRSV 0.5 ML IM: CPT | Performed by: ORTHOPAEDIC SURGERY

## 2020-09-18 PROCEDURE — 97165 OT EVAL LOW COMPLEX 30 MIN: CPT

## 2020-09-18 PROCEDURE — 97110 THERAPEUTIC EXERCISES: CPT

## 2020-09-18 PROCEDURE — G0008 ADMIN INFLUENZA VIRUS VAC: HCPCS | Performed by: ORTHOPAEDIC SURGERY

## 2020-09-18 PROCEDURE — 25010000002 INFLUENZA VAC SPLIT QUAD 0.5 ML SUSPENSION PREFILLED SYRINGE: Performed by: ORTHOPAEDIC SURGERY

## 2020-09-18 RX ORDER — HYDROCODONE BITARTRATE AND ACETAMINOPHEN 7.5; 325 MG/1; MG/1
TABLET ORAL
Qty: 42 TABLET | Refills: 0 | Status: SHIPPED | OUTPATIENT
Start: 2020-09-18 | End: 2020-09-30 | Stop reason: SDUPTHER

## 2020-09-18 RX ORDER — PSEUDOEPHEDRINE HCL 30 MG
100 TABLET ORAL 2 TIMES DAILY PRN
Start: 2020-09-18 | End: 2021-04-20

## 2020-09-18 RX ORDER — ONDANSETRON 4 MG/1
4 TABLET, FILM COATED ORAL EVERY 6 HOURS PRN
Qty: 12 TABLET | Refills: 0 | Status: SHIPPED | OUTPATIENT
Start: 2020-09-18 | End: 2020-09-30

## 2020-09-18 RX ADMIN — POLYETHYLENE GLYCOL 3350 17 G: 17 POWDER, FOR SOLUTION ORAL at 08:33

## 2020-09-18 RX ADMIN — SODIUM CHLORIDE, PRESERVATIVE FREE 3 ML: 5 INJECTION INTRAVENOUS at 08:33

## 2020-09-18 RX ADMIN — HYDROCHLOROTHIAZIDE: 12.5 CAPSULE ORAL at 08:33

## 2020-09-18 RX ADMIN — ATORVASTATIN CALCIUM 40 MG: 20 TABLET, FILM COATED ORAL at 08:33

## 2020-09-18 RX ADMIN — TIOTROPIUM BROMIDE INHALATION SPRAY 2 PUFF: 3.12 SPRAY, METERED RESPIRATORY (INHALATION) at 11:57

## 2020-09-18 RX ADMIN — INFLUENZA VIRUS VACCINE 0.5 ML: 15; 15; 15; 15 SUSPENSION INTRAMUSCULAR at 13:01

## 2020-09-18 RX ADMIN — HYDROCODONE BITARTRATE AND ACETAMINOPHEN 1 TABLET: 7.5; 325 TABLET ORAL at 12:54

## 2020-09-18 RX ADMIN — MUPIROCIN 1 APPLICATION: 20 OINTMENT TOPICAL at 08:33

## 2020-09-18 RX ADMIN — MELOXICAM 15 MG: 15 TABLET ORAL at 08:33

## 2020-09-18 RX ADMIN — HYDROCODONE BITARTRATE AND ACETAMINOPHEN 1 TABLET: 7.5; 325 TABLET ORAL at 04:14

## 2020-09-18 RX ADMIN — HYDROCODONE BITARTRATE AND ACETAMINOPHEN 1 TABLET: 7.5; 325 TABLET ORAL at 08:34

## 2020-09-18 RX ADMIN — CETIRIZINE HYDROCHLORIDE 10 MG: 10 TABLET ORAL at 08:33

## 2020-09-18 RX ADMIN — SODIUM CHLORIDE, PRESERVATIVE FREE 3 ML: 5 INJECTION INTRAVENOUS at 08:35

## 2020-09-18 RX ADMIN — BUDESONIDE AND FORMOTEROL FUMARATE DIHYDRATE 2 PUFF: 160; 4.5 AEROSOL RESPIRATORY (INHALATION) at 11:57

## 2020-09-18 NOTE — PLAN OF CARE
Goal Outcome Evaluation:  Plan of Care Reviewed With: patient  Progress: improving  Outcome Summary: Pt seen for UE HEP review and demo and ed/demo ADL tasks and sling use.  Pt plans to d/c home with assist of  today.  No further skilled acute care OT needs.    DC home

## 2020-09-18 NOTE — THERAPY DISCHARGE NOTE
"Acute Care - Occupational Therapy Discharge  [facility]    Patient Name: Yenny Iglesias  : 1955    MRN: [id number]                              Today's Date: 2020       Admit Date: 2020    Visit Dx:     ICD-10-CM ICD-9-CM   1. S/P reverse total shoulder arthroplasty, right  Z96.611 V43.61   2. Complete tear of right rotator cuff  M75.121 727.61     Patient Active Problem List   Diagnosis   • Asthma   • COPD (chronic obstructive pulmonary disease) (CMS/formerly [facility])   • Hypertension   • Hypoxemia   • Lung nodule   • Sinusitis   • Rotator cuff arthropathy   • Complete tear of right rotator cuff     Past Medical History:   Diagnosis Date   • Arthritis    • Asthma    • Chest pain     SEEN BY PCP 2020  - [facility] EVERYWHERE, STATED \"DID NOT WANT TO SEE CARDIO OR GET STRESS TEST\" IN PCP NOTE    • COPD (chronic obstructive pulmonary disease) (CMS/formerly [facility])    • Dyspnea    • History of transfusion    • Hyperlipidemia    • Hypertension    • IBS (irritable bowel syndrome)    • On home oxygen therapy     3L NC PRN and at bedtime     Past Surgical History:   Procedure Laterality Date   • ANKLE HARDWARE REMOVAL Left 2008   • BACK SURGERY  2008   • LUMBAR DISCECTOMY  2013   • ORIF ANKLE FRACTURE Left    • ROTATOR CUFF REPAIR Right 10/20/2014   • TOTAL SHOULDER ARTHROPLASTY W/ DISTAL CLAVICLE EXCISION Right 2020    Procedure: Reverse Total Shoulder Arthroplasty;  Surgeon: Jus Valdez MD;  Location: [facility] MAIN OR;  Service: Orthopedics;  Laterality: Right;     General Information     Row Name 20 1302          OT Time and Intention    Document Type  evaluation;discharge evaluation/summary;therapy note (daily note)  -LE     Mode of Treatment  individual therapy;occupational therapy  -LE     Row Name 20 1306          General Information    Patient Profile Reviewed  yes  -LE     Prior Level of Function  independent:;gait;transfer;ADL's  -LE     Existing Precautions/Restrictions  -- " Sling at all times except hygiene and exercises..  NWB R UE  -     Row Name 09/18/20 1302          Living Environment    Lives With  spouse  -     Row Name 09/18/20 1302          Cognition    Orientation Status (Cognition)  oriented x 4  -LE       User Key  (r) = Recorded By, (t) = Taken By, (c) = Cosigned By    Initials Name Provider Type    Martha Dodd OTR Occupational Therapist        Mobility/ADL's     Row Name 09/18/20 1303          Bed Mobility    Comment (Bed Mobility)  in chair  -     Row Name 09/18/20 1303          Transfers    Comment (Transfers)  SBA to stand.  -     Row Name 09/18/20 1303          Functional Mobility    Functional Mobility- Comment  denies dificulty with walking to BR  -[facility] Name 09/18/20 1303          Activities of Daily Living    BADL Assessment/Intervention  upper body dressing  -[facility] Name 09/18/20 1303          Upper Body Dressing Assessment/Training    Comment (Upper Body Dressing)  already dressed.  OTverbally review thread tech, sling position and how to doff/dale.  -LE       User Key  (r) = Recorded By, (t) = Taken By, (c) = Cosigned By    Initials Name Provider Type    Martha Dodd OTR Occupational Therapist        Obj/Interventions     Row Name 09/18/20 1304          Sensory Assessment (Somatosensory)    Sensory Assessment (Somatosensory)  -- block to R UE  -[facility] Name 09/18/20 1304          Range of Motion Comprehensive    Comment, General Range of Motion  AROM R UE:  fingers, wrist, forearm 2/3.  defer shld.  -[facility] Name 09/18/20 1304          Therapeutic Exercise    Therapeutic Exercise  -- Issue and demo shoulder HEP with assist and support for pendulum ex in standing. Ed complete HEP 1 set of 10 and 5-6 times a day.Ed pt not to complete exercises on own until block worn off and full sensation has returned.  -LE       User Key  (r) = Recorded By, (t) = Taken By, (c) = Cosigned By    Initials Name Provider Type    Martha Dodd OTR  Occupational Therapist        Goals/Plan     Row Name 09/18/20 1306          Dressing Goal 1 (OT)    Activity/Device (Dressing Goal 1, OT)  upper body dressing ed on thread tech for UBD.  -LE     [place]/Cues Needed (Dressing Goal 1, OT)  supervision required  -LE     Time Frame (Dressing Goal 1, OT)  1 day  -LE     Progress/Outcome (Dressing Goal 1, OT)  goal met  -ZENAIDA     Row Name 09/18/20 1306          ROM Goal 1 (OT)    ROM Goal 1 (OT)  Issue and review shoulde protocol HEP for RUE.  -LE     Time Frame (ROM Goal 1, OT)  1 day  -LE     Progress/Outcome (ROM Goal 1, OT)  goal met  -LE       User Key  (r) = Recorded By, (t) = Taken By, (c) = Cosigned By    Initials Name Provider Type    Martha Dodd OTR Occupational Therapist        Clinical Impression     Row Name 09/18/20 1305          Pain Assessment    Additional Documentation  Pain Scale: Numbers Pre/Post-Treatment (Group)  -ZENAIDA     Row Name 09/18/20 1305          Pain Scale: Numbers Pre/Post-Treatment    Pretreatment Pain Rating  0/10 - no pain  -LE     Posttreatment Pain Rating  0/10 - no pain  -[facility] Name 09/18/20 1305          Plan of Care Review    Plan of Care Reviewed With  patient  -LE     Outcome Summary  Pt seen for UE HEP review and demo and ed/demo ADL tasks and sling use.  Pt plans to d/c home with assist of  today.  No further skilled acute care OT needs.  -ZENAIDA     Row Name 09/18/20 1305          Therapy Assessment/Plan (OT)    Therapy Frequency (OT)  evaluation only  -[facility] Name 09/18/20 1305          Therapy Plan Review/Discharge Plan (OT)    Anticipated Discharge Disposition (OT)  home with assist  -ZENAIDA     [facility] Name 09/18/20 1305          Vital Signs    O2 Delivery Intra Treatment  room air  -LE     Pre Patient Position  Sitting  -LE     Intra Patient Position  Standing  -LE     Post Patient Position  Sitting  -[facility] Name 09/18/20 1305          Positioning and Restraints    Pre-Treatment Position  sitting in  chair/recliner  -LE     Post Treatment Position  chair  -LE     In Chair  sitting;call light within reach;encouraged to call for assist;RUE elevated  -LE       User Key  (r) = Recorded By, (t) = Taken By, (c) = Cosigned By    Initials Name Provider Type    Martha Dodd OTR Occupational Therapist        Outcome Measures     Row Name 09/18/20 1307          How much help from another is currently needed...    Putting on and taking off regular lower body clothing?  3  -LE     Bathing (including washing, rinsing, and drying)  3  -LE     Toileting (which includes using toilet bed pan or urinal)  3  -LE     Putting on and taking off regular upper body clothing  2  -LE     Taking care of personal grooming (such as brushing teeth)  3  -LE     Eating meals  3  -LE     AM-PAC 6 Clicks Score (OT)  17  -LE     Row Name 09/18/20 1307          Functional Assessment    Outcome Measure Options  AM-PAC 6 Clicks Daily Activity (OT)  -LE       User Key  (r) = Recorded By, (t) = Taken By, (c) = Cosigned By    Initials Name Provider Type    Martha Dodd OTR Occupational Therapist        Occupational Therapy Education                 Title: PT OT SLP Therapies (Done)     Topic: Occupational Therapy (Done)     Point: ADL training (Done)     Description:   Instruct learner(s) on proper safety adaptation and remediation techniques during self care or transfers.   Instruct in proper use of assistive devices.              Learning Progress Summary           Patient Acceptance, E,TB,D,H, DU,VU by ZENAIDA at 9/18/2020 1308                   Point: Home exercise program (Done)     Description:   Instruct learner(s) on appropriate technique for monitoring, assisting and/or progressing therapeutic exercises/activities.              Learning Progress Summary           Patient Acceptance, E,TB,D,H, DU,VU by ZENAIDA at 9/18/2020 1308                   Point: Precautions (Done)     Description:   Instruct learner(s) on prescribed precautions during  self-care and functional transfers.              Learning Progress Summary           Patient Acceptance, E,TB,D,H, DU,VU by ZENAIDA at 9/18/2020 1308                   Point: Body mechanics (Done)     Description:   Instruct learner(s) on proper positioning and spine alignment during self-care, functional mobility activities and/or exercises.              Learning Progress Summary           Patient Acceptance, E,TB,D,H, DU,VU by ZENAIDA at 9/18/2020 1308                               User Key     Initials Effective Dates Name Provider Type On license of [facility] 06/08/18 -  Martha Uribe OTR Occupational Therapist OT              OT Recommendation and Plan  Retired Outcome Summary/Treatment Plan (OT)  Anticipated Discharge Disposition (OT): home with assist(OP PT per ortho MD)  Therapy Frequency (OT): evaluation only  Plan of Care Review  Plan of Care Reviewed With: patient  Outcome Summary: Pt seen for UE HEP review and demo and ed/demo ADL tasks and sling use.  Pt plans to d/c home with assist of  today.  No further skilled acute care OT needs.  Plan of Care Reviewed With: patient  Outcome Summary: Pt seen for UE HEP review and demo and ed/demo ADL tasks and sling use.  Pt plans to d/c home with assist of  today.  No further skilled acute care OT needs.     Time Calculation:   Time Calculation- OT     Row Name 09/18/20 1308             Time Calculation- OT    OT Start Time  1002  -LE      OT Stop Time  1017  -LE      OT Time Calculation (min)  15 min  -LE      Total Timed Code Minutes- OT  10 minute(s)  -LE      OT Received On  09/18/20  -        User Key  (r) = Recorded By, (t) = Taken By, (c) = Cosigned By    Initials Name Provider Type    Martha Dodd OTR Occupational Therapist        Therapy Charges for Today     Code Description Service Date Service Provider Modifiers Qty    [id number]  OT EVAL LOW COMPLEXITY 2 9/18/2020 Martha Uribe OTR GO 1    [id number]  OT THER PROC EA 15 MIN 9/18/2020 Rony  Martha, OTR GO 1               Martha Uribe, OTR  9/18/2020

## 2020-09-18 NOTE — PROGRESS NOTES
Continued Stay Note  [facility]     Patient Name: Yenny Iglesias  MRN: [id number]  Today's Date: 9/18/2020    Admit Date: 9/17/2020    Discharge Plan     Row Name 09/18/20 1301       Plan    Final Discharge Disposition Code  01 - home or self-care    Final Note  Pt to d/c home. S/p TSA.        Discharge Codes    No documentation.       Expected Discharge Date and Time     Expected Discharge Date Expected Discharge Time    Sep 18, 2020             Magdalene Huddleston RN

## 2020-09-18 NOTE — PLAN OF CARE
Problem: Adult Inpatient Plan of Care  Goal: Plan of Care Review  Outcome: Resolved for upgrade, new template will be applied  Flowsheets  Taken 9/18/2020 1308  Plan of Care Reviewed With: patient  Taken 9/18/2020 1305  Plan of Care Reviewed With: patient  Outcome Summary: Pt seen for UE HEP review and demo and ed/demo ADL tasks and sling use.  Pt plans to d/c home with assist of  today.  No further skilled acute care OT needs.       Patient was wearing a face mask during this therapy encounter. Therapist used appropriate personal protective equipment including surgical mask, eye shield and gloves during the entire therapy session. Hand hygiene was completed before and after therapy session. Patient is not in enhanced droplet precautions.

## 2020-09-18 NOTE — DISCHARGE SUMMARY
Date of Admission:  9/17/2020    Date of Discharge:  9/18/2020    Discharge Diagnosis: s/p Right reverse total shoulder arthroplasty    Admitting Physician: Jus Valdez    Consults: none    DETAILS OF HOSPITAL STAY:  Patient is a 65 y.o. female was admitted to the floor following a reverse total shoulder arthroplasty.  Post-operatively the patient was transferred to the post-operative floor where the patient underwent physical therapy including both active and passive ROM exercises. Opioids were titrated to achieve appropriate pain management to allow for participation in mobilization exercises. Vital signs are now stable. The incision is benign without signs or symptoms of infection. Operative extremity neurovascular status remains intact. Appropriate education re: incision care, activity levels, medications, and follow up visits was completed and all questions were answered. The patient is now deemed stable for discharge to home.    Condition on Discharge:  Stable    Discharge Medications     Discharge Medications      New Medications      Instructions Start Date   docusate sodium 100 MG capsule   100 mg, Oral, 2 Times Daily PRN      HYDROcodone-acetaminophen 7.5-325 MG per tablet  Commonly known as: NORCO   Take 1-2 tabs by mouth every 4 to 6 hours as needed for pain. Not to exceed 6 tabs per day.      ondansetron 4 MG tablet  Commonly known as: ZOFRAN   4 mg, Oral, Every 6 Hours PRN         Continue These Medications      Instructions Start Date   Align 4 MG capsule   1 tablet, Oral, Daily      aspirin 81 MG EC tablet   81 mg, Oral, Daily, HELD FOR OR       atorvastatin 40 MG tablet  Commonly known as: LIPITOR   40 mg, Oral, Daily      budesonide-formoterol 160-4.5 MCG/ACT inhaler  Commonly known as: SYMBICORT   2 puffs, Inhalation, Daily      cetirizine 10 MG tablet  Commonly known as: zyrTEC   10 mg, Oral, Daily      Ibuprofen 200 MG capsule   200 mg, Oral, 2 times daily, HELD FOR OR      metoprolol  succinate XL 50 MG 24 hr tablet  Commonly known as: TOPROL-XL   50 mg, Oral, Nightly      Prempro 0.45-1.5 MG per tablet  Generic drug: estrogen (conjugated)-medroxyprogesterone   1 tablet, Oral, Nightly      ProAir  (90 Base) MCG/ACT inhaler  Generic drug: albuterol sulfate HFA   2 puffs, Inhalation, Every 4 Hours PRN      SPIRIVA RESPIMAT IN   25 mcg, Inhalation, Daily      temazepam 30 MG capsule  Commonly known as: RESTORIL   15 mg, Oral, Nightly      valsartan-hydrochlorothiazide 160-25 MG per tablet  Commonly known as: DIOVAN-HCT   1 tablet, Oral, Daily      Vitamin D 50 MCG (2000 UT) tablet   2,000 Units, Oral, Daily         Stop These Medications    acetaminophen 325 MG tablet  Commonly known as: TYLENOL     Chlorhexidine Gluconate Cloth 2 % pads     mupirocin 2 % nasal ointment  Commonly known as: BACTROBAN            Discharge Diet: regular    Activity at Discharge: as tolerated    Discharge Instructions:   1)  Patient is to continue with physical therapy exercises daily and continue working with the physical therapist as ordered.  2)  Continue to follow precautions as instructed.   3)  Patient is instructed to continue use of the sling except when performing their physical therapy exercising and while dressing or showering.  4)  Continue to ice regularly. Patient also instructed on incentive spirometer during hospitalization and encouraged to continue to use at home regularly.   5)  The dressing should be left in place. If waterproof dressing is intact the patient may shower immediately following discharge. If the dressing becomes disloged or saturated it should be changed and patient must wait until POD #5 to shower. If dressing is changed, apply dry sterile dressing after showering.  6)  Follow up appointment in 2 weeks - patient to call the office at [phone] to schedule.     Follow-up Appointments  2 weeks with Dr Courtney Arceo, APRN  09/18/20  15:08 EDT

## 2020-09-18 NOTE — PLAN OF CARE
Goal Outcome Evaluation:  Plan of Care Reviewed With: patient  Progress: improving  Outcome Summary: VSS, SL, O2 per home dose, voiding per BRP, up with standby assist, sling in place, educated on BP monitoring, home today

## 2020-09-18 NOTE — PLAN OF CARE
Goal Outcome Evaluation:  Plan of Care Reviewed With: patient  Progress: improving  Outcome Summary: VSS, SL, O2 per home dose, voiding per BRP, up with standby assist, sling in place, educated on BP monitoring, home today    Plan is for DC home today. Per ptKimberly's to see prior to DC for new sling

## 2020-09-19 PROCEDURE — 25010000002 INFLUENZA VAC SPLIT QUAD 0.5 ML SUSPENSION PREFILLED SYRINGE: Performed by: ORTHOPAEDIC SURGERY

## 2020-09-19 PROCEDURE — G0008 ADMIN INFLUENZA VIRUS VAC: HCPCS | Performed by: ORTHOPAEDIC SURGERY

## 2020-09-19 PROCEDURE — 90686 IIV4 VACC NO PRSV 0.5 ML IM: CPT | Performed by: ORTHOPAEDIC SURGERY

## 2020-09-30 ENCOUNTER — OFFICE VISIT (OUTPATIENT)
Dept: ORTHOPEDIC SURGERY | Facility: CLINIC | Age: 65
End: 2020-09-30

## 2020-09-30 VITALS — WEIGHT: 172.1 LBS | BODY MASS INDEX: 25.49 KG/M2 | TEMPERATURE: 98.6 F | HEIGHT: 69 IN

## 2020-09-30 DIAGNOSIS — Z96.611 S/P REVERSE TOTAL SHOULDER ARTHROPLASTY, RIGHT: Primary | ICD-10-CM

## 2020-09-30 PROCEDURE — 73030 X-RAY EXAM OF SHOULDER: CPT | Performed by: ORTHOPAEDIC SURGERY

## 2020-09-30 PROCEDURE — 99024 POSTOP FOLLOW-UP VISIT: CPT | Performed by: ORTHOPAEDIC SURGERY

## 2020-09-30 RX ORDER — HYDROCODONE BITARTRATE AND ACETAMINOPHEN 7.5; 325 MG/1; MG/1
1 TABLET ORAL EVERY 4 HOURS PRN
Qty: 50 TABLET | Refills: 0 | Status: SHIPPED | OUTPATIENT
Start: 2020-09-30 | End: 2021-04-20

## 2020-09-30 NOTE — PROGRESS NOTES
"Yenny Iglesias : 1955 MRN: [id number] DATE: 2020    DIAGNOSIS:  2 week follow up right shoulder arthroplasty      SUBJECTIVE:  Patient returns today for 2 week follow up of right shoulder replacement. Patient reports doing well with no unusual complaints.      OBJECTIVE:    Temp 98.6 °F (37 °C)   Ht 175.3 cm (69\")   Wt 78.1 kg (172 lb 1.6 oz)   BMI 25.41 kg/m²     Exam:  The incision is well approximated.  No erythema or drainage. Shoulder moves fluidly with pendulums.  The arm is soft and nontender.  Intact motor and sensory function in the lower arm and hand.  Palpable radial pulse.    DIAGNOSTIC STUDIES    Xrays: AP and scapular Y views of the right shoulder are ordered and reviewed for evaluation of the recent shoulder replacement.  The x-rays demonstrate a well positioned, well aligned replacement without complicating factors noted.  In comparison with previous films, there has been no change.    ASSESSMENT: 2 week follow up right shoulder replacement.    PLAN:   1.  Begin PT per protocol--prescription given along with 2 copies of my protocol.  2.  Continue sling until next visit.  3.  Counseled patient about appropriate activity modifications and restrictions, including no driving at this point.    Jus Valdez MD    "
Normal

## 2020-10-05 ENCOUNTER — TREATMENT (OUTPATIENT)
Dept: PHYSICAL THERAPY | Facility: CLINIC | Age: 65
End: 2020-10-05

## 2020-10-05 DIAGNOSIS — Z96.611 S/P REVERSE TOTAL SHOULDER ARTHROPLASTY, RIGHT: Primary | ICD-10-CM

## 2020-10-05 DIAGNOSIS — M25.60 RANGE OF MOTION DEFICIT: ICD-10-CM

## 2020-10-05 PROCEDURE — 97530 THERAPEUTIC ACTIVITIES: CPT | Performed by: PHYSICAL THERAPIST

## 2020-10-05 PROCEDURE — 97161 PT EVAL LOW COMPLEX 20 MIN: CPT | Performed by: PHYSICAL THERAPIST

## 2020-10-05 NOTE — PROGRESS NOTES
Physical Therapy Initial Evaluation and Plan of Care      Patient: Yenny Iglesias   : 1955  Diagnosis/ICD-10 Code:  S/P reverse total shoulder arthroplasty, right [Z96.611]  Referring practitioner: Jus Valdez MD  Date of Initial Visit: 10/5/2020  Today's Date: 10/5/2020  Patient seen for 1 sessions    Progress Note Due: 2020       Subjective Questionnaire: QuickDASH: 32/55 = 58%      Subjective Evaluation    History of Present Illness  Date of surgery: 2020  Mechanism of injury: The patient presents to the clinic s/p right reverse total shoulder on 2020. Patient not wearing a shoulder sling in initial evaluation. Patient unsure of post-op precautions. Patient reports doing pendulum exercises 2-3x/day. In last 24 hours, patient reports an increase in pain - she thinks she tweaked it flushing the commode. PMH includes COPD, spinal fusions x 2 (~12 yrs and 30 yrs ago), left ankle surgery (pins and plate) per patient report.       Patient Occupation: Retired  Pain  Current pain ratin  At best pain ratin  At worst pain rating: 10  Location: Right acromion and lateral arm  Quality: dull ache and sharp  Relieving factors: medications  Aggravating factors: movement    Hand dominance: right    Patient Goals  Patient goals for therapy: decreased pain, increased motion and independence with ADLs/IADLs             Objective          Observations     Right Shoulder  Positive for incision.       Palpation     Right   Hypertonic in the upper trapezius. Tenderness of the biceps, middle deltoid, pectoralis major, pectoralis minor and supraspinatus.     Active Range of Motion   Left Shoulder   Normal active range of motion    Additional Active Range of Motion Details  Right shoulder AROM not tested due to post-op precautions.    Passive Range of Motion   Left Shoulder   Normal passive range of motion    Right Shoulder   Flexion: 125 degrees with pain  Abduction: 60 (Scaption) degrees with  pain  External rotation 0°: 28 degrees with pain    Additional Passive Range of Motion Details  Additional Right shoulder PROM not tested due to post-op precautions.    Strength/Myotome Testing     Left Shoulder     Planes of Motion   Flexion: 5   Abduction: 5   External rotation at 0°: 5   Internal rotation at 0°: 5     Left Elbow   Normal strength    Right Elbow   Flexion: 4+  Extension: 4+    Left Wrist/Hand   Normal wrist strength     (2nd hand position)     Trial 1: 20 lbs    Trial 2: 20 lbs    Trial 3: 21 lbs    Average: 20.33 lbs    Right Wrist/Hand   Wrist extension: 4+  Wrist flexion: 4+     (2nd hand position)     Trial 1: 29 lbs    Trial 2: 24 lbs    Trial 3: 24 lbs    Average: 25.67 lbs    Additional Strength Details  Right shoulder strength not tested due to post-op precautions.      Education: Patient educated extensively on post-op precautions (provided patient with handout), home exercise program, plan of care     Assessment & Plan     Assessment  Impairments: abnormal muscle tone, abnormal or restricted ROM, activity intolerance, impaired physical strength, lacks appropriate home exercise program and pain with function  Assessment details: The patient is a 65 year old female that reports to the clinic s/p right reverse total shoulder arthoplasty on 9/17/2020. The patient exhibits decreased right shoulder passive and active range of motion and decreased right upper extremity strength. These factors are contributing to limitations in the patient's ability to reach behind her back, reach overhead, carry groceries, bathe and dress herself, and perform housework. She would benefit from skilled physical therapy in order to address the stated deficits and return to her previous level of function.   Prognosis: good  Functional Limitations: carrying objects, lifting, uncomfortable because of pain, reaching behind back and reaching overhead  Goals  Plan Goals: Short Term: 6 weeks  1. The patient will  perform home exercises with minimal cuing.  2. The patient will be independent with post-op precautions.   3. The patient will tolerate strengthening exercises (30 minutes) with < 5/10 pain.    Long Term: 12 weeks   1. The patient will perform home exercises independently.  2. The patient will improve Quick DASH score to 25% impaired.  3. The patient will improve right shoulder AROM to WFL to bathe and dress independently.   4. The patient will improve right global shoulder strength to 4+/5 to perform household chores and ADLs.     Plan  Therapy options: will be seen for skilled physical therapy services  Planned modality interventions: high voltage pulsed current (pain management), dry needling and cryotherapy  Planned therapy interventions: body mechanics training, flexibility, functional ROM exercises, home exercise program, IADL retraining, joint mobilization, manual therapy, neuromuscular re-education, postural training, soft tissue mobilization, spinal/joint mobilization, strengthening, stretching and therapeutic activities  Frequency: 2x week  Duration in weeks: 16  Treatment plan discussed with: patient        Visit Diagnoses:    ICD-10-CM ICD-9-CM   1. S/P reverse total shoulder arthroplasty, right  Z96.611 V43.61   2. Range of motion deficit  M25.60 719.50       Timed:  Manual Therapy:         mins  75989;  Therapeutic Exercise:    5     mins  94059;     Neuromuscular Jay:        mins  49905;    Therapeutic Activity:     15     mins  33487;     Gait Training:           mins  84770;     Ultrasound:          mins  36302;    Electrical Stimulation:         mins  16751 ( );    Untimed:  Electrical Stimulation:         mins  47501 ( );  Mechanical Traction:         mins  11445;     Timed Treatment:  41    mins   Total Treatment:    43    mins    PT SIGNATURE: Madhavi Marcial, GAYE   DATE TREATMENT INITIATED: 10/5/2020    Initial Certification  Certification Period: 1/3/2021  I certify that the  therapy services are furnished while this patient is under my care.  The services outlined above are required by this patient, and will be reviewed every 90 days.     PHYSICIAN: Jus Valdez MD      DATE:     Please sign and return via fax to [phone]. Thank you, [facility].

## 2020-10-20 ENCOUNTER — TREATMENT (OUTPATIENT)
Dept: PHYSICAL THERAPY | Facility: CLINIC | Age: 65
End: 2020-10-20

## 2020-10-20 DIAGNOSIS — Z96.611 S/P REVERSE TOTAL SHOULDER ARTHROPLASTY, RIGHT: Primary | ICD-10-CM

## 2020-10-20 DIAGNOSIS — M25.60 RANGE OF MOTION DEFICIT: ICD-10-CM

## 2020-10-20 PROCEDURE — 97140 MANUAL THERAPY 1/> REGIONS: CPT | Performed by: PHYSICAL THERAPIST

## 2020-10-20 PROCEDURE — 97110 THERAPEUTIC EXERCISES: CPT | Performed by: PHYSICAL THERAPIST

## 2020-10-20 NOTE — PROGRESS NOTES
" Physical Therapy Daily Progress Note    Visit #2    Subjective     Yenny Iglesias reports: shoulder is sore today, \"because I overdo it\"        Objective   See Exercise, Manual, and Modality Logs for complete treatment.       Assessment/Plan  Initiated AAROM exercises per protocol, tolerated well. Advised pt to continue using ice to manage soreness. Written copy of new exercises provided for HEP.   Progress per Plan of Care           Manual Therapy:    15     mins  18349;  Therapeutic Exercise:    30     mins  39932;     Neuromuscular Jay:    0    mins  19759;    Therapeutic Activity:     0     mins  47783;     Gait Trainin     mins  86125;     Ultrasound:     0     mins  16214;    Electrical Stimulation:    0     mins  94459 ( );    Timed Treatment:   45   mins   Total Treatment:     55   mins    Janeth Freed PT, DPT  Physical Therapist  [place] License #[id number]                    "

## 2020-10-23 ENCOUNTER — TREATMENT (OUTPATIENT)
Dept: PHYSICAL THERAPY | Facility: CLINIC | Age: 65
End: 2020-10-23

## 2020-10-23 DIAGNOSIS — M25.60 RANGE OF MOTION DEFICIT: ICD-10-CM

## 2020-10-23 DIAGNOSIS — Z96.611 S/P REVERSE TOTAL SHOULDER ARTHROPLASTY, RIGHT: Primary | ICD-10-CM

## 2020-10-23 PROCEDURE — 97110 THERAPEUTIC EXERCISES: CPT | Performed by: PHYSICAL THERAPIST

## 2020-10-23 PROCEDURE — 97140 MANUAL THERAPY 1/> REGIONS: CPT | Performed by: PHYSICAL THERAPIST

## 2020-10-23 NOTE — PROGRESS NOTES
Physical Therapy Daily Progress Note    Visit #3    Subjective     Yenny Iglesias reports: shoulder is feeling good      Objective   See Exercise, Manual, and Modality Logs for complete treatment.       Assessment/Plan  Flexion, scaption ROM is improved compared to last session. Added wall slides to HEP.  Progress per Plan of Care           Manual Therapy:    12     mins  97977;  Therapeutic Exercise:    26     mins  34636;     Neuromuscular Jay:    0    mins  86421;    Therapeutic Activity:     0     mins  18998;     Gait Trainin     mins  64488;     Ultrasound:     0     mins  11629;    Electrical Stimulation:    0     mins  12804 ( );    Timed Treatment:   38   mins   Total Treatment:     48   mins    Janeth Freed PT, DPT  Physical Therapist  [place] License #[id number]

## 2020-10-27 ENCOUNTER — TREATMENT (OUTPATIENT)
Dept: PHYSICAL THERAPY | Facility: CLINIC | Age: 65
End: 2020-10-27

## 2020-10-27 DIAGNOSIS — Z96.611 S/P REVERSE TOTAL SHOULDER ARTHROPLASTY, RIGHT: Primary | ICD-10-CM

## 2020-10-27 DIAGNOSIS — M25.60 RANGE OF MOTION DEFICIT: ICD-10-CM

## 2020-10-27 PROCEDURE — 97110 THERAPEUTIC EXERCISES: CPT | Performed by: PHYSICAL THERAPIST

## 2020-10-27 PROCEDURE — 97140 MANUAL THERAPY 1/> REGIONS: CPT | Performed by: PHYSICAL THERAPIST

## 2020-10-27 NOTE — PROGRESS NOTES
Physical Therapy Daily Progress Note    Visit #4    Subjective     Yenny Iglesias reports: shoulder is sore, but overall doing OK. Sees MD tomorrow.       Objective          Passive Range of Motion     Right Shoulder   Flexion: 140 degrees   Abduction: 120 (scaption) degrees   External rotation 45°: 30 degrees       See Exercise, Manual, and Modality Logs for complete treatment.       Assessment/Plan   PROM/AAROM progressing well.   Progress per Plan of Care           Manual Therapy:    12     mins  12237;  Therapeutic Exercise:    30     mins  82739;     Neuromuscular Jay:    0    mins  72222;    Therapeutic Activity:     0     mins  00982;     Gait Trainin     mins  88909;     Ultrasound:     0     mins  75833;    Electrical Stimulation:    0     mins  57082 ( );    Timed Treatment:   42   mins   Total Treatment:     52   mins    Janeth Freed PT, DPT  Physical Therapist  [place] License #[id number]

## 2020-10-28 ENCOUNTER — OFFICE VISIT (OUTPATIENT)
Dept: ORTHOPEDIC SURGERY | Facility: CLINIC | Age: 65
End: 2020-10-28

## 2020-10-28 VITALS — TEMPERATURE: 98.6 F | WEIGHT: 172.1 LBS | BODY MASS INDEX: 25.49 KG/M2 | HEIGHT: 69 IN

## 2020-10-28 DIAGNOSIS — Z96.611 S/P REVERSE TOTAL SHOULDER ARTHROPLASTY, RIGHT: Primary | ICD-10-CM

## 2020-10-28 DIAGNOSIS — Z09 SURGERY FOLLOW-UP: ICD-10-CM

## 2020-10-28 PROCEDURE — 99024 POSTOP FOLLOW-UP VISIT: CPT | Performed by: ORTHOPAEDIC SURGERY

## 2020-10-28 PROCEDURE — 73030 X-RAY EXAM OF SHOULDER: CPT | Performed by: ORTHOPAEDIC SURGERY

## 2020-10-28 RX ORDER — HYDROCODONE BITARTRATE AND ACETAMINOPHEN 7.5; 325 MG/1; MG/1
1 TABLET ORAL EVERY 8 HOURS PRN
Qty: 50 TABLET | Refills: 0 | Status: SHIPPED | OUTPATIENT
Start: 2020-10-28 | End: 2021-04-20

## 2020-10-28 NOTE — PROGRESS NOTES
"Yenny Iglesias : 1955 MRN: [id number] DATE: 10/28/2020    DIAGNOSIS: 6 week follow up right shoulder arthroplasty      SUBJECTIVE:  Patient returns today for 6 week follow up of right shoulder replacement. Patient reports doing well with no unusual complaints.     OBJECTIVE:    Temp 98.6 °F (37 °C)   Ht 175.3 cm (69\")   Wt 78.1 kg (172 lb 1.6 oz)   BMI 25.41 kg/m²     Exam: The incision is well healed. No erythema or drainage. Shoulder moves fluidly with pendulums.  Motion is on track per protocol.  The arm is soft and nontender.  Good motor and sensory function.  Palpable distal pulses.     DIAGNOSTIC STUDIES    Xrays: AP and scapular Y views of the right shoulder are ordered and reviewed for evaluation of shoulder replacement.  They demonstrate a well positioned, well aligned replacement without complicating factors noted.  In comparison with previous films there has been no change.    ASSESSMENT: 6 week follow up right shoulder replacement    PLAN:   1.  Continue PT per protocol.  2.  Discontinue sling and begin working on progressing ROM as tolerated.  3.  Counseled patient about appropriate activity modifications and restrictions.  Released to drive at this point.    Jus Valdez MD    10/28/2020     "

## 2020-10-30 ENCOUNTER — TREATMENT (OUTPATIENT)
Dept: PHYSICAL THERAPY | Facility: CLINIC | Age: 65
End: 2020-10-30

## 2020-10-30 DIAGNOSIS — M25.60 RANGE OF MOTION DEFICIT: ICD-10-CM

## 2020-10-30 DIAGNOSIS — Z96.611 S/P REVERSE TOTAL SHOULDER ARTHROPLASTY, RIGHT: Primary | ICD-10-CM

## 2020-10-30 PROCEDURE — 97110 THERAPEUTIC EXERCISES: CPT | Performed by: PHYSICAL THERAPIST

## 2020-10-30 PROCEDURE — 97140 MANUAL THERAPY 1/> REGIONS: CPT | Performed by: PHYSICAL THERAPIST

## 2020-10-30 NOTE — PATIENT INSTRUCTIONS
Access Code: [email or web address]   URL: [email or web address]   Date: 10/30/2020   Prepared by: Janeth Freed     Exercises  Isometric Shoulder Extension at Wall - 10 reps - 1 sets - 5 hold - 1x daily  Isometric Shoulder Abduction at Wall - 10 reps - 1 sets - 5 hold - 1x daily  Isometric Shoulder Flexion at Wall - 10 reps - 1 sets - 5 hold - 1x daily

## 2020-10-30 NOTE — PROGRESS NOTES
Physical Therapy Daily Progress Note    Visit #5    Subjective     Yenny Iglesias reports: she saw MD, who is pleased with progress.       Objective   See Exercise, Manual, and Modality Logs for complete treatment.       Assessment/Plan  AAROM is steadily improving. Initiated isometrics today per protocol, written copy given for HEP.  Progress per Plan of Care           Manual Therapy:    13     mins  65664;  Therapeutic Exercise:    32     mins  41648;     Neuromuscular Jay:    0    mins  64263;    Therapeutic Activity:     0     mins  88292;     Gait Trainin     mins  82405;     Ultrasound:     0     mins  01602;    Electrical Stimulation:    0     mins  62850 ( );    Timed Treatment:   45   mins   Total Treatment:     55   mins    Janeth Freed, PT, DPT  Physical Therapist  [place] License #[id number]

## 2020-11-03 ENCOUNTER — TREATMENT (OUTPATIENT)
Dept: PHYSICAL THERAPY | Facility: CLINIC | Age: 65
End: 2020-11-03

## 2020-11-03 ENCOUNTER — TELEPHONE (OUTPATIENT)
Dept: ORTHOPEDIC SURGERY | Facility: CLINIC | Age: 65
End: 2020-11-03

## 2020-11-03 DIAGNOSIS — M25.60 RANGE OF MOTION DEFICIT: ICD-10-CM

## 2020-11-03 DIAGNOSIS — Z96.611 S/P REVERSE TOTAL SHOULDER ARTHROPLASTY, RIGHT: Primary | ICD-10-CM

## 2020-11-03 PROCEDURE — 97110 THERAPEUTIC EXERCISES: CPT | Performed by: PHYSICAL THERAPIST

## 2020-11-03 PROCEDURE — 97140 MANUAL THERAPY 1/> REGIONS: CPT | Performed by: PHYSICAL THERAPIST

## 2020-11-03 NOTE — TELEPHONE ENCOUNTER
Left a message on patient HUY Mitchell,      Can you see if she might be able to come see Keira at [place] next week?  Thanks

## 2020-11-03 NOTE — PROGRESS NOTES
Physical Therapy Daily Progress Note    Visit #6    Subjective     Yenny Iglesias reports: her shoulder is moving better. Does report some tingling in her fingers, and weakness with .       Objective          Strength/Myotome Testing     Right Wrist/Hand      (2nd hand position)     Trial 1: 20 lbs    Trial 2: 19 lbs    Trial 3: 20 lbs    Average: 19.67 lbs      See Exercise, Manual, and Modality Logs for complete treatment.       Assessment/Plan   strength decreased by 5# compared to initial evaluation. Shoulder ROM progressing well per protocol. Added cervical sidebending stretch to HEP.   Progress per Plan of Care           Manual Therapy:    12     mins  50023;  Therapeutic Exercise:    30     mins  42250;     Neuromuscular Jay:    0    mins  04044;    Therapeutic Activity:     0     mins  32036;     Gait Trainin     mins  81264;     Ultrasound:     0     mins  48729;    Electrical Stimulation:    0     mins  60547 ( );    Timed Treatment:   42   mins   Total Treatment:     52   mins    Janeth Freed PT, DPT  Physical Therapist  [place] License #[id number]

## 2020-11-06 ENCOUNTER — TREATMENT (OUTPATIENT)
Dept: PHYSICAL THERAPY | Facility: CLINIC | Age: 65
End: 2020-11-06

## 2020-11-06 DIAGNOSIS — M25.60 RANGE OF MOTION DEFICIT: ICD-10-CM

## 2020-11-06 DIAGNOSIS — Z96.611 S/P REVERSE TOTAL SHOULDER ARTHROPLASTY, RIGHT: Primary | ICD-10-CM

## 2020-11-06 PROCEDURE — 97140 MANUAL THERAPY 1/> REGIONS: CPT | Performed by: PHYSICAL THERAPIST

## 2020-11-06 PROCEDURE — 97110 THERAPEUTIC EXERCISES: CPT | Performed by: PHYSICAL THERAPIST

## 2020-11-06 NOTE — PROGRESS NOTES
Re-Assessment / Re-Certification    Patient: Yenny Iglesias   : 1955  Diagnosis/ICD-10 Code:  S/P reverse total shoulder arthroplasty, right [Z96.611]  Referring practitioner: Jus Valdez MD  Date of Initial Visit: 10/5/2020  Today's Date: 2020  Patient seen for 7 sessions      Subjective:   Yenny Iglesias reports: she feels that her motion is steadily getting better. She has been in more pain the past few days, attributes this to doing a lot around the house. Still with  weakness, is seeing MD in ~1 week.   Subjective Questionnaire: QuickDASH: 41% limitation  Clinical Progress: improved  Home Program Compliance: Yes  Treatment has included: therapeutic exercise, manual therapy, therapeutic activity and cryotherapy    Subjective   Objective          Passive Range of Motion     Right Shoulder   Flexion: 142 degrees   Abduction: 144 (scaption) degrees   External rotation 45°: 41 degrees       Assessment/Plan  Progress toward previous goals: Partially Met    Short Term: 6 weeks  1. The patient will perform home exercises with minimal cueing. (MET)  2. The patient will be independent with post-op precautions. (MET)  3. The patient will tolerate strengthening exercises (30 minutes) with < 5/10 pain. (MET)    Long Term: 12 weeks   1. The patient will perform home exercises independently. (PROGRESSING)  2. The patient will improve Quick DASH score to 25% impaired. (PROGRESSING)  3. The patient will improve right shoulder AROM to WFL to bathe and dress independently. (PROGRESSING)  4. The patient will improve right global shoulder strength to 4+/5 to perform household chores and ADLs. (PROGRESSING)      Recommendations: Continue as planned per protocol  Timeframe: 1 month  Prognosis to achieve goals: good    PT Signature: Janeth Freed, PT, DPT                         Physical Therapist                         [place] License #[id number]    Based upon review of the patient's progress and continued therapy plan,  it is my medical opinion that Yenny Iglesias should continue physical therapy treatment at [facility]  [address] [address] [phone].    Signature: __________________________________  Jus Valdez MD    Manual Therapy:    10     mins  63907;  Therapeutic Exercise:    30     mins  53881;     Neuromuscular Jay:    0    mins  81731;    Therapeutic Activity:     0     mins  79718;     Gait Trainin     mins  68388;     Ultrasound:     0     mins  61302;    Electrical Stimulation:    0     mins  87104 ( );    Timed Treatment:   40   mins   Total Treatment:     50   mins

## 2020-11-16 ENCOUNTER — OFFICE VISIT (OUTPATIENT)
Dept: ORTHOPEDIC SURGERY | Facility: CLINIC | Age: 65
End: 2020-11-16

## 2020-11-16 VITALS — TEMPERATURE: 97.6 F | BODY MASS INDEX: 25.48 KG/M2 | WEIGHT: 172 LBS | HEIGHT: 69 IN

## 2020-11-16 DIAGNOSIS — G56.21 CUBITAL TUNNEL SYNDROME ON RIGHT: Primary | ICD-10-CM

## 2020-11-16 DIAGNOSIS — G56.21 CUBITAL TUNNEL SYNDROME ON RIGHT: ICD-10-CM

## 2020-11-16 PROCEDURE — 99212 OFFICE O/P EST SF 10 MIN: CPT | Performed by: NURSE PRACTITIONER

## 2020-11-16 PROCEDURE — 73070 X-RAY EXAM OF ELBOW: CPT | Performed by: NURSE PRACTITIONER

## 2020-11-16 RX ORDER — MELOXICAM 15 MG/1
15 TABLET ORAL DAILY
Qty: 30 TABLET | Refills: 1 | Status: SHIPPED | OUTPATIENT
Start: 2020-11-16 | End: 2020-11-16

## 2020-11-16 RX ORDER — MELOXICAM 15 MG/1
15 TABLET ORAL DAILY
Qty: 90 TABLET | Refills: 0 | Status: SHIPPED | OUTPATIENT
Start: 2020-11-16 | End: 2021-04-20

## 2020-11-16 NOTE — PROGRESS NOTES
Patient: Yenny Iglesias    YOB: 1955    Medical Record Number: [id number]    Chief Complaints:  Right hand numbness and tingling    History of Present Illness:     65 y.o. female patient who presents for evaluation of a new complaint of right hand intermittent numbness and tingling.  Symptoms began 2-3 weeks ago.  She is 2 months s/p right reverse total shoulder with Dr. Valdez.  She cannot recall if she had experienced similar symptoms prior to surgery or not.  Patient reports associated numbness and tingling intermittently in the right small finger.  Reports her physical therapist measured her  strength and found it was slightly decreased.  Denies constant numbness.  Denies any alleviating factors.  The symptoms are somewhat worse at night.   Of note, she reports the shoulder is doing well.       Allergies: No Known Allergies    Home Medications:    Current Outpatient Medications:   •  albuterol (PROAIR HFA) 108 (90 BASE) MCG/ACT inhaler, Inhale 2 puffs Every 4 (Four) Hours As Needed., Disp: , Rfl:   •  aspirin (aspirin) 81 MG EC tablet, Take 81 mg by mouth Daily. HELD FOR OR, Disp: , Rfl:   •  atorvastatin (LIPITOR) 40 MG tablet, Take 40 mg by mouth Daily., Disp: , Rfl:   •  budesonide-formoterol (SYMBICORT) 160-4.5 MCG/ACT inhaler, Inhale 2 puffs Daily., Disp: , Rfl:   •  cetirizine (zyrTEC) 10 MG tablet, Take 10 mg by mouth Daily., Disp: , Rfl:   •  Cholecalciferol (VITAMIN D) 50 MCG (2000 UT) tablet, Take 2,000 Units by mouth Daily., Disp: , Rfl:   •  HYDROcodone-acetaminophen (NORCO) 7.5-325 MG per tablet, Take 1 tablet by mouth Every 4 (Four) Hours As Needed for Moderate Pain ., Disp: 50 tablet, Rfl: 0  •  Ibuprofen 200 MG capsule, Take 200 mg by mouth 2 (two) times a day. HELD FOR OR, Disp: , Rfl:   •  metoprolol succinate XL (TOPROL-XL) 50 MG 24 hr tablet, Take 50 mg by mouth Every Night., Disp: , Rfl:   •  PREMPRO 0.45-1.5 MG per tablet, Take 1 tablet by mouth Every Night., Disp: ,  "Rfl:   •  Probiotic Product (ALIGN) 4 MG capsule, Take 1 tablet by mouth Daily., Disp: , Rfl:   •  temazepam (RESTORIL) 30 MG capsule, Take 15 mg by mouth Every Night., Disp: , Rfl: 2  •  Tiotropium Bromide Monohydrate (SPIRIVA RESPIMAT IN), Inhale 25 mcg Daily., Disp: , Rfl:   •  valsartan-hydrochlorothiazide (DIOVAN-HCT) 160-25 MG per tablet, Take 1 tablet by mouth Daily., Disp: , Rfl:   •  docusate sodium 100 MG capsule, Take 100 mg by mouth 2 (Two) Times a Day As Needed for Constipation., Disp: , Rfl:   •  HYDROcodone-acetaminophen (NORCO) 7.5-325 MG per tablet, Take 1 tablet by mouth Every 8 (Eight) Hours As Needed for Moderate Pain ., Disp: 50 tablet, Rfl: 0  No current facility-administered medications for this visit.     Facility-Administered Medications Ordered in Other Visits:   •  Chlorhexidine Gluconate 2 % pads 1 each, 1 each, Apply externally, Take As Directed, Jus Valdez MD    Past Medical History:   Diagnosis Date   • Arthritis    • Asthma    • Chest pain     SEEN BY PCP 7/2020  - [facility] EVERYWHERE, STATED \"DID NOT WANT TO SEE CARDIO OR GET STRESS TEST\" IN PCP NOTE    • COPD (chronic obstructive pulmonary disease) (CMS/[facility])    • Dyspnea    • History of transfusion    • Hyperlipidemia    • Hypertension    • IBS (irritable bowel syndrome)    • On home oxygen therapy     3L NC PRN and at bedtime       Past Surgical History:   Procedure Laterality Date   • ANKLE HARDWARE REMOVAL Left 07/14/2008   • BACK SURGERY  08/2008   • LUMBAR DISCECTOMY  2013   • ORIF ANKLE FRACTURE Left 2008   • ROTATOR CUFF REPAIR Right 10/20/2014   • TOTAL SHOULDER ARTHROPLASTY W/ DISTAL CLAVICLE EXCISION Right 9/17/2020    Procedure: Reverse Total Shoulder Arthroplasty;  Surgeon: Jus Valdez MD;  Location: [facility];  Service: Orthopedics;  Laterality: Right;       Social History     Occupational History   • Not on file   Tobacco Use   • Smoking status: Former Smoker     Packs/day: 1.00     Years: 20.00    " " Pack years: 20.00     Types: Cigarettes     Quit date: 1998     Years since quittin.4   • Smokeless tobacco: Never Used   Substance and Sexual Activity   • Alcohol use: Yes     Comment: 4 TIMES/ WEEK    • Drug use: Never   • Sexual activity: Defer      Social History     Social History Narrative   • Not on file       Family History   Problem Relation Age of Onset   • Hypertension Other    • Diabetes Other    • Heart disease Other    • Malig Hyperthermia Neg Hx      Review of Systems:      Constitutional: Denies fever, shaking or chills   Eyes: Denies change in visual acuity   HEENT: Denies nasal congestion or sore throat   Respiratory: Denies cough or shortness of breath   Cardiovascular: Denies chest pain or edema  Endocrine: Denies tremors, palpitations, intolerance of heat or cold, polyuria, polydipsia.  GI: Denies abdominal pain, nausea, vomiting, bloody stools or diarrhea  : Denies frequency, urgency, incontinence, retention, or nocturia.  Musculoskeletal: Denies numbness tingling or loss of motor function except as above  Integument: Denies rash, lesion or ulceration   Neurologic: Denies headache or focal weakness, deficits  Heme: Denies epistaxis, spontaneous or excessive bleeding, epistaxis, hematuria, melena, fatigue, enlarged or tender lymph nodes.      All other pertinent positives and negatives as noted above in HPI.    Physical Exam: 65 y.o. female  Vitals:    20 1328   Temp: 97.6 °F (36.4 °C)   Weight: 78 kg (172 lb)   Height: 175.3 cm (69\")     General:  Patient is awake and alert.  Appears in no acute distress or discomfort.    Psych:  Affect and demeanor are appropriate.    Eyes:  Conjunctiva and sclera appear grossly normal.  Eyes track well and EOM seem to be intact.    Ears:  No gross abnormalities.  Hearing adequate for the exam.    Cardiovascular:  Regular rate and rhythm.    Lungs:  Good chest expansion.  Breathing unlabored.    Lymph:  No palpable masses or adenopathy in " right upper extremity    Right upper extremity:   Skin is benign.  No obvious swellings, masses or atrophy.  No palpable masses or adenopathy.  Moderate tenderness over the cubital tunnel.  Full elbow and wrist range of motion.  No instability.  Positive Tinel's over cubital tunnel.  Good , pinch, finger and thumb abduction strength in the hand.  Intact sensation throughout the forearm and hand.  Good radial pulse.  Brisk cap refill         Radiology:   AP and lateral views of the right elbow are ordered by myself and reviewed to evaluate the patient's complaint.  No comparison films are immediately available.  The x-rays show no obvious acute abnormalities, lesions, masses, significant degenerative changes, or other concerning findings.    AP, oblique, and lateral views of the right hand are ordered by myself and reviewed to evaluate the patient's complaint.  No comparison films are immediately available.  The x-rays shows calcification at the 2nd metacarpal head.  Otherwise, no obvious acute abnormalities, lesions, masses, significant degenerative changes, or other concerning findings.    Assessment:  Right cubital tunnel syndrome    Plan:  Her symptoms are likely a direct result from sling use post surgery.  We discussed all available treatments for this condition in detail.  With regards to conservative treatment, we discussed anti-inflammatories and use of a night splint.  I have recommended that we start with the splint and a prescription for an anti-inflammatory medicine.  Patient has agreed.  Risks of the medicine were discussed.      If the symptoms progress or do not respond to the conservative treatment then we may need to consider surgical options.  I did explain that if they start to experience any constant numbness or weakness, that this would be an indication of potentially permanent damage and I would have a low threshold to intervene at that point.  The patient has acknowledged understanding of  this information and will contact me as needed.    Keira Arceo, APRN    11/16/2020    CC to Leopoldo Hopper MD

## 2020-12-04 ENCOUNTER — TREATMENT (OUTPATIENT)
Dept: PHYSICAL THERAPY | Facility: CLINIC | Age: 65
End: 2020-12-04

## 2020-12-04 DIAGNOSIS — Z96.611 S/P REVERSE TOTAL SHOULDER ARTHROPLASTY, RIGHT: Primary | ICD-10-CM

## 2020-12-04 DIAGNOSIS — M25.60 RANGE OF MOTION DEFICIT: ICD-10-CM

## 2020-12-04 PROCEDURE — 97530 THERAPEUTIC ACTIVITIES: CPT | Performed by: PHYSICAL THERAPIST

## 2020-12-04 PROCEDURE — 97110 THERAPEUTIC EXERCISES: CPT | Performed by: PHYSICAL THERAPIST

## 2020-12-04 NOTE — PATIENT INSTRUCTIONS
Access Code: [email or web address]   URL: [email or web address]   Date: 12/04/2020   Prepared by: Janeth Freed     Exercises  Seated Gentle Upper Trapezius Stretch - 3 reps - 20 hold - 1x daily  Gentle Levator Scapulae Stretch - 3 reps - 20 hold - 1x daily  Scaption Wall Slide with Towel - 15 reps - 1 sets - 1x daily  Shoulder Flexion Wall Slide with Towel - 15 reps - 1 sets - 1x daily Standing Row with Anchored Resistance - 15 reps - 1 sets - 1x daily - 7x weekly  Shoulder extension with resistance - Neutral - 15 reps - 1 sets - 1x daily - 7x weekly  Shoulder Internal Rotation with Resistance - 15 reps - 1 sets - 1x daily - 7x weekly  Shoulder External Rotation Reactive Isometrics - 15 reps - 1 sets - 7x weekly  Supine Scapular Protraction in Flexion with Dumbbells - 15 reps - 1 sets - 1x daily - 7x weekly  Supine Shoulder Alphabet - 26 reps - 1 sets - 1x daily - 7x weekly  Supine Shoulder Flexion with Free Weight - 15 reps - 1 sets - 7x weekly

## 2020-12-04 NOTE — PROGRESS NOTES
Re-Assessment / Re-Certification    Patient: Yenny Iglesias   : 1955  Diagnosis/ICD-10 Code:  S/P reverse total shoulder arthroplasty, right [Z96.611]  Referring practitioner: Jus Valdez MD  Date of Initial Visit: 10/5/2020  Today's Date: 2020  Patient seen for 8 sessions      Subjective:   Yenny Iglesias reports: she is doing pretty well at home, primary limitation with regard to ROM is reaching behind her back.   Subjective Questionnaire: QuickDASH: 32% limitation (improved from 41% at last reassessment)  Clinical Progress: improved  Home Program Compliance: Yes  Treatment has included: therapeutic exercise, neuromuscular re-education, manual therapy, therapeutic activity and cryotherapy    Subjective   Objective          Active Range of Motion   Left Shoulder   Flexion: 151 degrees   Abduction: 142 degrees   External rotation BTH: C4   Internal rotation BTB: Active internal rotation behind the back: lateral hip.       Assessment/Plan  Progress toward previous goals: Partially Met    Short Term: 6 weeks  1. The patient will perform home exercises with minimal cueing. (MET)  2. The patient will be independent with post-op precautions. (MET)  3. The patient will tolerate strengthening exercises (30 minutes) with < 5/10 pain. (MET)    Long Term: 12 weeks   1. The patient will perform home exercises independently. (PROGRESSING)  2. The patient will improve Quick DASH score to 25% impaired. (PROGRESSING)  3. The patient will improve right shoulder AROM to WFL to bathe and dress independently. (PROGRESSING)  4. The patient will improve right global shoulder strength to 4+/5 to perform household chores and ADLs. (PROGRESSING)      Recommendations: Continue as planned with focus on functional AROM and strengthening.  Timeframe: 1 month  Prognosis to achieve goals: good    PT Signature: Janeth Freed, PT, DPT                         Physical Therapist                         [place] License #[id number]    Based  upon review of the patient's progress and continued therapy plan, it is my medical opinion that Yenny Iglesias should continue physical therapy treatment at [facility]  [address] [place] [id number] [phone].    Signature: __________________________________  Jus Valdez MD    Manual Therapy:    0     mins  53329;  Therapeutic Exercise:    25     mins  23304;     Neuromuscular Jay:    0    mins  91540;    Therapeutic Activity:     15     mins  07191;     Gait Trainin     mins  29561;     Ultrasound:     0     mins  78502;    Electrical Stimulation:    0     mins  53636 ( );    Timed Treatment:   40   mins   Total Treatment:     50   mins

## 2020-12-07 ENCOUNTER — TREATMENT (OUTPATIENT)
Dept: PHYSICAL THERAPY | Facility: CLINIC | Age: 65
End: 2020-12-07

## 2020-12-07 DIAGNOSIS — M25.60 RANGE OF MOTION DEFICIT: ICD-10-CM

## 2020-12-07 DIAGNOSIS — Z96.611 S/P REVERSE TOTAL SHOULDER ARTHROPLASTY, RIGHT: Primary | ICD-10-CM

## 2020-12-07 PROCEDURE — 97110 THERAPEUTIC EXERCISES: CPT | Performed by: PHYSICAL THERAPIST

## 2020-12-07 PROCEDURE — 97530 THERAPEUTIC ACTIVITIES: CPT | Performed by: PHYSICAL THERAPIST

## 2020-12-07 NOTE — PROGRESS NOTES
Physical Therapy Daily Progress Note    Visit #9    Subjective     Yenny Iglesias reports: no new complaints, felt good after new exercises last visit.      Objective   See Exercise, Manual, and Modality Logs for complete treatment.       Assessment/Plan  Progressed strengthening exercises today, as well as initiating gentle functional IR ROM. Tolerated well without pain.  Progress per Plan of Care           Manual Therapy:    0     mins  92912;  Therapeutic Exercise:    25     mins  13284;     Neuromuscular Jay:    0    mins  13264;    Therapeutic Activity:     15     mins  20094;     Gait Trainin     mins  92187;     Ultrasound:     0     mins  72349;    Electrical Stimulation:    0     mins  89391 ( );    Timed Treatment:   40   mins   Total Treatment:     50   mins    Janeth Freed PT, DPT  Physical Therapist  [place] License #[id number]

## 2020-12-15 ENCOUNTER — TREATMENT (OUTPATIENT)
Dept: PHYSICAL THERAPY | Facility: CLINIC | Age: 65
End: 2020-12-15

## 2020-12-15 DIAGNOSIS — M25.60 RANGE OF MOTION DEFICIT: ICD-10-CM

## 2020-12-15 DIAGNOSIS — Z96.611 S/P REVERSE TOTAL SHOULDER ARTHROPLASTY, RIGHT: Primary | ICD-10-CM

## 2020-12-15 PROCEDURE — 97110 THERAPEUTIC EXERCISES: CPT | Performed by: PHYSICAL THERAPIST

## 2020-12-15 PROCEDURE — 97530 THERAPEUTIC ACTIVITIES: CPT | Performed by: PHYSICAL THERAPIST

## 2020-12-15 NOTE — PROGRESS NOTES
Physical Therapy Daily Progress Note    Visit #10    Subjective     Yenny Iglesias reports: no new complaints.      Objective   See Exercise, Manual, and Modality Logs for complete treatment.       Assessment/Plan  Pt requires cueing for posture with some exercises, but overall doing well with strengthening program.   Progress per Plan of Care           Manual Therapy:    0     mins  25211;  Therapeutic Exercise:    24     mins  07938;     Neuromuscular Jay:    0    mins  94606;    Therapeutic Activity:     16     mins  93554;     Gait Trainin     mins  95075;     Ultrasound:     0     mins  68118;    Electrical Stimulation:    0     mins  04532 ( );    Timed Treatment:   40   mins   Total Treatment:     50   mins    Janeth Freed, PT, DPT  Physical Therapist  [place] License #[id number]

## 2020-12-21 ENCOUNTER — TELEPHONE (OUTPATIENT)
Dept: PHYSICAL THERAPY | Facility: CLINIC | Age: 65
End: 2020-12-21

## 2020-12-29 ENCOUNTER — TREATMENT (OUTPATIENT)
Dept: PHYSICAL THERAPY | Facility: CLINIC | Age: 65
End: 2020-12-29

## 2020-12-29 DIAGNOSIS — M25.60 RANGE OF MOTION DEFICIT: ICD-10-CM

## 2020-12-29 DIAGNOSIS — Z96.611 S/P REVERSE TOTAL SHOULDER ARTHROPLASTY, RIGHT: Primary | ICD-10-CM

## 2020-12-29 PROCEDURE — 97110 THERAPEUTIC EXERCISES: CPT | Performed by: PHYSICAL THERAPIST

## 2021-01-27 ENCOUNTER — OFFICE VISIT (OUTPATIENT)
Dept: ORTHOPEDIC SURGERY | Facility: CLINIC | Age: 66
End: 2021-01-27

## 2021-01-27 VITALS — HEIGHT: 70 IN | BODY MASS INDEX: 24.62 KG/M2 | WEIGHT: 172 LBS | TEMPERATURE: 97.3 F

## 2021-01-27 DIAGNOSIS — Z09 SURGERY FOLLOW-UP: Primary | ICD-10-CM

## 2021-01-27 PROCEDURE — 99212 OFFICE O/P EST SF 10 MIN: CPT | Performed by: ORTHOPAEDIC SURGERY

## 2021-01-27 PROCEDURE — 73030 X-RAY EXAM OF SHOULDER: CPT | Performed by: ORTHOPAEDIC SURGERY

## 2021-01-27 NOTE — PROGRESS NOTES
"Yenny Iglesias : 1955 MRN: [id number] DATE: 2021    DIAGNOSIS: 4 month follow up right shoulder arthroplasty      SUBJECTIVE:  Patient returns today for 4 month follow up of right shoulder replacement.  Patient reports doing well with no unusual complaints. Still in PT and reports making good progress.  Some residual incisional discomfort.    OBJECTIVE:    Temp 97.3 °F (36.3 °C)   Ht 177.8 cm (70\")   Wt 78 kg (172 lb)   BMI 24.68 kg/m²     Review of Systems negative except as above    Exam:  The incision is well healed. No effusion.  Range of motion is excellent except IR which is limited to L2. The arm is soft and nontender.  Good strength with elevation and abduction.  Sensation intact distally.  Brisk cap refill.    DIAGNOSTIC STUDIES    Xrays: AP, scapular Y and axillary views of the right shoulder are ordered and reviewed for evaluation of shoulder replacement. They demonstrate a well positioned, well aligned replacement without complicating factors noted.  In comparison with previous films there has been no change.    ASSESSMENT: 3 month follow up right shoulder replacement.    PLAN:   1.  Continue appropriate activity modifications and restrictions as discussed  2.  Continue PT per protocol.  3.  I explained that one can expect continued improvement in motion, function, and any residual discomfort for up to 1 year after surgery.  4.  Follow up at 1 year unless experiencing any new or concerning symptoms.  5.  Rx alternatives for the incisional discomfort.    Jus Valdez MD    2021     "

## 2021-04-06 ENCOUNTER — APPOINTMENT (OUTPATIENT)
Dept: WOMENS IMAGING | Facility: HOSPITAL | Age: 66
End: 2021-04-06

## 2021-04-06 PROCEDURE — G0279 TOMOSYNTHESIS, MAMMO: HCPCS | Performed by: RADIOLOGY

## 2021-04-06 PROCEDURE — 77066 DX MAMMO INCL CAD BI: CPT | Performed by: RADIOLOGY

## 2021-04-06 PROCEDURE — 76641 ULTRASOUND BREAST COMPLETE: CPT | Performed by: RADIOLOGY

## 2021-04-08 ENCOUNTER — APPOINTMENT (OUTPATIENT)
Dept: WOMENS IMAGING | Facility: HOSPITAL | Age: 66
End: 2021-04-08

## 2021-04-08 PROCEDURE — 19083 BX BREAST 1ST LESION US IMAG: CPT | Performed by: RADIOLOGY

## 2021-04-08 PROCEDURE — 88305 TISSUE EXAM BY PATHOLOGIST: CPT

## 2021-04-08 PROCEDURE — 88361 TUMOR IMMUNOHISTOCHEM/COMPUT: CPT

## 2021-04-12 ENCOUNTER — DOCUMENTATION (OUTPATIENT)
Dept: PHYSICAL THERAPY | Facility: CLINIC | Age: 66
End: 2021-04-12

## 2021-04-12 DIAGNOSIS — Z96.611 S/P REVERSE TOTAL SHOULDER ARTHROPLASTY, RIGHT: Primary | ICD-10-CM

## 2021-04-12 DIAGNOSIS — M25.60 RANGE OF MOTION DEFICIT: ICD-10-CM

## 2021-04-12 NOTE — PROGRESS NOTES
Discharge Report    Patient Name: Yenny Iglesias       Patient MRN: [id number]  : 1955  Date: 2021    Encounter Diagnoses   Name Primary?   • S/P reverse total shoulder arthroplasty, right Yes   • Range of motion deficit        Treatment has included therapeutic exercise, manual therapy and cryotherapy    Assessment: See last progress note dated 2020  Progress towards goals: Partially Met    Discharge Reason: Anticipate patient will achieve long-term goals independently      Plan of Care: Continue with current home exercise program as instructed    Prognosis: good    Understanding at Discharge: good

## 2021-04-20 ENCOUNTER — OFFICE VISIT (OUTPATIENT)
Dept: SURGERY | Facility: CLINIC | Age: 66
End: 2021-04-20

## 2021-04-20 VITALS — HEIGHT: 69 IN | WEIGHT: 172.2 LBS | BODY MASS INDEX: 25.51 KG/M2

## 2021-04-20 DIAGNOSIS — Z17.0 MALIGNANT NEOPLASM OF NIPPLE OF LEFT BREAST IN FEMALE, ESTROGEN RECEPTOR POSITIVE (HCC): Primary | ICD-10-CM

## 2021-04-20 DIAGNOSIS — C50.012 MALIGNANT NEOPLASM OF NIPPLE OF LEFT BREAST IN FEMALE, ESTROGEN RECEPTOR POSITIVE (HCC): Primary | ICD-10-CM

## 2021-04-20 PROCEDURE — 99204 OFFICE O/P NEW MOD 45 MIN: CPT | Performed by: STUDENT IN AN ORGANIZED HEALTH CARE EDUCATION/TRAINING PROGRAM

## 2021-04-20 RX ORDER — ACETAMINOPHEN 500 MG
500 TABLET ORAL EVERY 6 HOURS PRN
COMMUNITY

## 2021-04-20 NOTE — PROGRESS NOTES
General Surgery History and Physical Exam     Summary:    65 y.o. 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      A multidisciplinary plan has been formulated for the patient:    (1) Breast Surgical Oncology:  -She has follow up with her pulmonologist next week. She will ask for OR clearance at this time.   -Discussed wire-localized left breast lumpectomy. Will discuss at tumor board regarding need for sentinel lymph node at time of excisional biopsy/lumpectomy.  -MRI prior to surgical intervention to evaluate right breast according to recommendations by radiology   -Plastic surgery referral deferred.     (2) Medical Oncology:  -Will refer postoperatively as needed.    (3) Radiation Oncology:  -Will refer postoperatively as needed.    Referring Provider: No ref. provider found    Chief Complaint: Left breast mass    History of Present Illness: Ms. Yenny Iglesias is a 65 year old lady, seen at the request of No ref. provider found for a new diagnosis of left breast lesion. This was initially detected as a palpable lump.  She then underwent work-up as mentioned below; her work-up is detailed in the oncologic history below. She denies any other breast lumps, breast pain, skin changes, or nipple discharge.  He has had annual mammograms.  She has had 1 needle biopsy several years ago that was benign. She denies any family history of breast or ovarian cancer.     Workup of Current Diagnosis:    4/6/2021 bilateral diagnostic mammogram:  Solid mass in the subareolar region of the left breast is suspicious.  Ultrasound-guided biopsy is recommended.  Simple cyst in the left breast are benign negative.  Masses in the right breast are benign negative.  Pathology of left breast palpable solid mass is malignant then breast MRI for adjunctive screening of the right breast would be recommended.  BI-RADS Category  4C: Suspicious abnormality.    2021 ultrasound guided core biopsy of left breast mass:  Technically successful left breast ultrasound-guided biopsy as described.  Contrast-enhanced breast MRI may be considered for further evaluation of extent of disease in the contralateral breast.  Left subareolar path:  Atypical papillary lesion favor solid papillary carcinoma.  Neoplasm is present in all 6 core biopsies measure 7 mm in greatest dimension.    Gynecologic History:   . P: 2. AB: 0.  Age at menarche: 10  Age at first childbirth: 18  Lactation/How lon  Age at menopause: 53  Total years of oral contraceptive use: 25 years  Total years of hormone replacement therapy: Has been on Prempro for several years    Past Medical History:   • Hyperlipidemia  • Hypertension  • COPD on 3L home oxygen at night    Past Surgical History:    • ORIF left ankle and subsequent removal  • Right rotator cuff repair  • Right total shoulder arthroplasty 2020  • Few back surgeries   • Lumbar discectomy    Family History:    • No family history of colon, breast, gynecologic cancers    Social History:   • Patient drinks 2-3 servings of caffeine per day.  • Denies tobacco use, quit smoking 20 years ago  • Occasional alcohol use    Allergies:   No Known Allergies    Medications:     Current Outpatient Medications:   •  albuterol (PROAIR HFA) 108 (90 BASE) MCG/ACT inhaler, Inhale 2 puffs Every 4 (Four) Hours As Needed., Disp: , Rfl:   •  aspirin (aspirin) 81 MG EC tablet, Take 81 mg by mouth Daily. HELD FOR OR, Disp: , Rfl:   •  atorvastatin (LIPITOR) 40 MG tablet, Take 40 mg by mouth Daily., Disp: , Rfl:   •  cetirizine (zyrTEC) 10 MG tablet, Take 10 mg by mouth Daily., Disp: , Rfl:   •  Cholecalciferol (VITAMIN D) 50 MCG (2000 UT) tablet, Take 2,000 Units by mouth Daily., Disp: , Rfl:   •  docusate sodium 100 MG capsule, Take 100 mg by mouth 2 (Two) Times a Day As Needed for Constipation., Disp: , Rfl:   •   HYDROcodone-acetaminophen (NORCO) 7.5-325 MG per tablet, Take 1 tablet by mouth Every 4 (Four) Hours As Needed for Moderate Pain ., Disp: 50 tablet, Rfl: 0  •  HYDROcodone-acetaminophen (NORCO) 7.5-325 MG per tablet, Take 1 tablet by mouth Every 8 (Eight) Hours As Needed for Moderate Pain ., Disp: 50 tablet, Rfl: 0  •  Ibuprofen 200 MG capsule, Take 200 mg by mouth 2 (two) times a day. HELD FOR OR, Disp: , Rfl:   •  meloxicam (MOBIC) 15 MG tablet, TAKE 1 TABLET BY MOUTH DAILY, Disp: 90 tablet, Rfl: 0  •  metoprolol succinate XL (TOPROL-XL) 50 MG 24 hr tablet, Take 50 mg by mouth Every Night., Disp: , Rfl:   •  PREMPRO 0.45-1.5 MG per tablet, Take 1 tablet by mouth Every Night., Disp: , Rfl:   •  Probiotic Product (ALIGN) 4 MG capsule, Take 1 tablet by mouth Daily., Disp: , Rfl:   •  temazepam (RESTORIL) 30 MG capsule, Take 15 mg by mouth Every Night., Disp: , Rfl: 2  •  Tiotropium Bromide Monohydrate (SPIRIVA RESPIMAT IN), Inhale 25 mcg Daily., Disp: , Rfl:   •  Trelegy Ellipta 100-62.5-25 MCG/INH aerosol powder , , Disp: , Rfl:   •  valsartan-hydrochlorothiazide (DIOVAN-HCT) 160-25 MG per tablet, Take 1 tablet by mouth Daily., Disp: , Rfl:   No current facility-administered medications for this visit.    Facility-Administered Medications Ordered in Other Visits:   •  Chlorhexidine Gluconate 2 % pads 1 each, 1 each, Apply externally, Take As Directed, Jus Valdez MD    Laboratory Values:    9/10/2020: White blood cell count 6.9 hemoglobin 13.0 platelets 233 creatinine 0.5    Review of Systems:   Influenza-like illness: no fever, no  cough, no  sore throat, no  body aches, no loss of sense of taste or smell, no known exposure to person with Covid-19.  Constitutional: Negative for fevers or chills  HENT: Negative for hearing loss or runny nose  Eyes: Negative for vision changes or scleral icterus  Respiratory: Negative for cough or shortness of breath  Cardiovascular: Negative for chest pain or heart  palpitations  Gastrointestinal: Negative for abdominal pain, nausea, vomiting, constipation, melena, or hematochezia  Genitourinary: Negative for hematuria or dysuria  Musculoskeletal: Negative for joint swelling or gait instability  Neurologic: Negative for tremors or seizures  Psychiatric: Negative for suicidal ideations or depression  All other systems reviewed and negative    Physical Exam:   • ECO - Symptomatic but completely ambulatory  • Constitutional: Well-developed well-nourished, no acute distress  • Eyes: Conjunctiva normal, sclera nonicteric  • ENMT: Hearing grossly normal, oral mucosa moist  • Neck: Supple, no palpable mass, trachea midline  • Respiratory: Clear to auscultation, normal inspiratory effort  • Cardiovascular: Regular rate, no murmur, no peripheral edema, no jugular venous distention  • Breast: symmetric  o Right: No visible abnormalities on inspection while seated, with arms raised or hands on hips. No masses, skin changes, or nipple abnormalities.  o Left: No visible abnormalities on inspection while seated, with arms raised or hands on hips. No masses, skin changes, or nipple abnormalities.  o Biopsy site appreciated in left lateral breast, otherwise no skin changes.   o No clinical chest wall involvement.  • Gastrointestinal: Soft, nontender  • Lymphatics (palpable nodes): No cervical, supraclavicular or axillary lymphadenopathy  • Skin:  Warm, dry, no rash on visualized skin surfaces  • Musculoskeletal: Symmetric strength, normal gait  • Psychiatric: Alert and oriented ×3, normal affect     Discussion:  I had an extensive discussion with the patient and her family about the nature of her breast cancer diagnosis. We reviewed the components of breast tissue including ducts and lobules. We reviewed her pathology report in detail. We reviewed breast cancer histology, including stage, grade, ER/VT receptors, HER2 receptors and how this applies to her diagnosis. We reviewed the basics  of systemic and local/regional management of breast cancer.      We discussed a wire localized breast lumpectomy.  We also discussed possible sentinel lymph node biopsy due to possibility of invasive disease.    I described additional risks and potential complications associated with surgery, including, but not limited to, bleeding, infection, deformity/poor cosmetic result, chronic pain, numbness, seroma, hematoma, deep venous thrombosis, skin flap necrosis, disease recurrence and the possibility of requiring additional surgery. We also discussed other treatment options including the option of not undergoing any surgical treatment and the risks associated with this including disease progression. She expressed an understanding of these factors and wished to proceed.    We discussed axillary staging. I described the procedure for sentinel lymph node biopsy in detail, including the preoperative injection of technetium sulfur colloid and intraoperative injection of lymphazurin blue dye. I explained that this is a mapping test and not a cancer test, that all of the lymph nodes containing these dyes will be removed for complete testing by pathology, and that the results could impact the decision for adjuvant treatment or additional surgery.    PB CONTE M.D.  General and Endoscopic Surgery  [facility]    [address], [address]  [place], [place], [address]  P: [phone]  F: [phone]

## 2021-05-04 ENCOUNTER — TELEPHONE (OUTPATIENT)
Dept: SURGERY | Facility: CLINIC | Age: 66
End: 2021-05-04

## 2021-05-04 NOTE — TELEPHONE ENCOUNTER
Patient wanted to let you know that due to a new medical issue with her lungs(COPD&lung nodules) she has decided to stay within the [facility] and therefor will not be scheduling her lumpectomy at this time.

## 2021-05-13 ENCOUNTER — APPOINTMENT (OUTPATIENT)
Dept: MRI IMAGING | Facility: HOSPITAL | Age: 66
End: 2021-05-13

## 2021-05-25 ENCOUNTER — TELEPHONE (OUTPATIENT)
Dept: SURGERY | Facility: CLINIC | Age: 66
End: 2021-05-25

## 2021-05-25 NOTE — TELEPHONE ENCOUNTER
I spoke with the patient and she has decided to seek treatment from a [facility] provider. I reiterated that should she wish to return we would be more than happy to resume her care that we just wanted to make sure that she was being treated for her Breast Cancer.

## 2021-05-25 NOTE — TELEPHONE ENCOUNTER
----- Message from Sabrina Burton MD sent at 5/24/2021  4:32 PM EDT -----  Regarding: Follow up  Hi-  She never followed up with me for her breast mass/concern for breast cancer. She told Christy she may find someone else but I can't find any documentation of that. Would someone mind reaching out and confirming/documenting that she is establishing care somewhere else?   Thanks-   Sabrina

## 2021-08-11 ENCOUNTER — OFFICE VISIT (OUTPATIENT)
Dept: ORTHOPEDIC SURGERY | Facility: CLINIC | Age: 66
End: 2021-08-11

## 2021-08-11 VITALS — TEMPERATURE: 96.1 F | HEIGHT: 70 IN | WEIGHT: 172 LBS | BODY MASS INDEX: 24.62 KG/M2

## 2021-08-11 DIAGNOSIS — Z09 SURGERY FOLLOW-UP: Primary | ICD-10-CM

## 2021-08-11 DIAGNOSIS — Z96.611 S/P REVERSE TOTAL SHOULDER ARTHROPLASTY, RIGHT: ICD-10-CM

## 2021-08-11 PROCEDURE — 73030 X-RAY EXAM OF SHOULDER: CPT | Performed by: ORTHOPAEDIC SURGERY

## 2021-08-11 PROCEDURE — 99212 OFFICE O/P EST SF 10 MIN: CPT | Performed by: ORTHOPAEDIC SURGERY

## 2021-08-11 NOTE — PROGRESS NOTES
"Yenny Iglesias : 1955 MRN: [id number] DATE: 2021    DIAGNOSIS:  Annual follow up for shoulder arthroplasty     SUBJECTIVE:  Patient returns today for annual follow-up. She reports doing well with no unusual complaints. Denies any limitations due to the shoulder.    OBJECTIVE:    Temp 96.1 °F (35.6 °C)   Ht 177.8 cm (70\")   Wt 78 kg (172 lb)   BMI 24.68 kg/m²   Family History   Problem Relation Age of Onset   • Hypertension Other    • Diabetes Other    • Heart disease Other    • Malig Hyperthermia Neg Hx      Past Medical History:   Diagnosis Date   • Arthritis    • Asthma    • Chest pain     SEEN BY PCP 2020  - [facility] EVERYWHERE, STATED \"DID NOT WANT TO SEE CARDIO OR GET STRESS TEST\" IN PCP NOTE    • COPD (chronic obstructive pulmonary disease) (CMS/[facility])    • Dyspnea    • History of transfusion    • Hyperlipidemia    • Hypertension    • IBS (irritable bowel syndrome)    • On home oxygen therapy     3L NC PRN and at bedtime     Past Surgical History:   Procedure Laterality Date   • ANKLE HARDWARE REMOVAL Left 2008   • BACK SURGERY  2008   • LUMBAR DISCECTOMY     • ORIF ANKLE FRACTURE Left    • ROTATOR CUFF REPAIR Right 10/20/2014   • TOTAL SHOULDER ARTHROPLASTY W/ DISTAL CLAVICLE EXCISION Right 2020    Procedure: Reverse Total Shoulder Arthroplasty;  Surgeon: Jus Valdez MD;  Location: [facility];  Service: Orthopedics;  Laterality: Right;     Social History     Socioeconomic History   • Marital status:      Spouse name: Not on file   • Number of children: Not on file   • Years of education: Not on file   • Highest education level: Not on file   Tobacco Use   • Smoking status: Former Smoker     Packs/day: 1.00     Years: 20.00     Pack years: 20.00     Types: Cigarettes     Quit date: 1998     Years since quittin.1   • Smokeless tobacco: Never Used   Vaping Use   • Vaping Use: Never used   Substance and Sexual Activity   • Alcohol use: Yes     " Comment: 4 TIMES/ WEEK    • Drug use: Never   • Sexual activity: Defer       14 point review of systems is reviewed with the patient.  Pertinent positives are listed above.  All others are negative.    Exam: Right shoulder:  The incision is well healed.  No effusion.  No tenderness.  FE: 160.  ER 50.  IR L2.  Good strength with elevation and abduction. Intact sensation to light touch.  Palpable radial pulse.    DIAGNOSTIC STUDIES    Xrays: AP, scapular Y and axillary views of right shoulder are ordered and reviewed for evaluation of shoulder replacements. The x-rays demonstrate well positioned, well aligned replacements without complicating factors noted. In comparison with previous films there has been no change.    ASSESSMENT:  Annual follow up for right shoulder replacement    PLAN:  Appropriate activity modifications and restrictions discussed.  Antibiotic prophylaxis recommendations discussed.  Continue annual follow-up.    Jus Valdez MD    08/11/2021

## 2022-04-20 ENCOUNTER — OFFICE VISIT (OUTPATIENT)
Dept: ORTHOPEDIC SURGERY | Facility: CLINIC | Age: 67
End: 2022-04-20

## 2022-04-20 VITALS — WEIGHT: 172 LBS | HEIGHT: 70 IN | BODY MASS INDEX: 24.62 KG/M2 | TEMPERATURE: 97 F

## 2022-04-20 DIAGNOSIS — Z96.611 S/P REVERSE TOTAL SHOULDER ARTHROPLASTY, RIGHT: Primary | ICD-10-CM

## 2022-04-20 DIAGNOSIS — M25.512 LEFT SHOULDER PAIN, UNSPECIFIED CHRONICITY: ICD-10-CM

## 2022-04-20 PROCEDURE — 99213 OFFICE O/P EST LOW 20 MIN: CPT | Performed by: ORTHOPAEDIC SURGERY

## 2022-04-20 PROCEDURE — 73030 X-RAY EXAM OF SHOULDER: CPT | Performed by: ORTHOPAEDIC SURGERY

## 2022-04-20 RX ORDER — ANASTROZOLE 1 MG/1
TABLET ORAL
COMMUNITY
Start: 2022-04-01

## 2022-04-20 RX ORDER — PHENOL 1.4 %
AEROSOL, SPRAY (ML) MUCOUS MEMBRANE
COMMUNITY
Start: 2021-12-03

## 2022-04-20 RX ORDER — OXYBUTYNIN CHLORIDE 5 MG/1
TABLET, EXTENDED RELEASE ORAL
COMMUNITY
Start: 2022-03-09

## 2022-04-20 RX ORDER — CETIRIZINE HYDROCHLORIDE 10 MG/1
1 TABLET ORAL DAILY
COMMUNITY
Start: 2021-12-03 | End: 2022-04-20 | Stop reason: SDUPTHER

## 2022-04-20 RX ORDER — FAMOTIDINE 20 MG/1
1 TABLET, FILM COATED ORAL DAILY
COMMUNITY
Start: 2021-12-03

## 2022-04-20 NOTE — PROGRESS NOTES
Yenny Iglesias : 1955 MRN: [id number] DATE: 2022    Chief complaint: Left shoulder pain    Ms. Iglesias is seen today for her left shoulder.  Over the past year she has had a couple of falls.  Her dog was elderly and she had a couple of falls related to walking him.  She has since had to put him down.  About a month ago, she fell again and aggravated the shoulder.  The shoulder has just continued to bother her.  She was worried that she may have a tear and so she wanted to get this evaluated.  Today she says the shoulder is actually feeling pretty good.  Current pain is mild.  She reports aching discomfort occasionally at night.  She does report some subjective weakness when trying to reach or lift with the left arm.    Her left shoulder and arm are examined.  Skin is benign.  No atrophy, swelling or masses.  No focal areas of significant tenderness.  She has good motion.  She has good strength in her rotator cuff and deltoid.  Negative belly press and bear hugger's.  Resisted elevation in the scapular plane is very mildly uncomfortable.  Negative Neer, Mott, speeds, Yergason's and Mobile's maneuvers.    AP, scapular Y and axillary views left shoulder ordered and reviewed to evaluate her complaint.  No comparison films are available.  No acute abnormalities.  No significant degenerative changes.  Acromiohumeral interval measures normal.  Glenoid version appears normal.    Assessment: Left rotator cuff tendinopathy versus small or partial-thickness tear.      Plan: Her exam is benign.  She has just very mild discomfort with resisted elevation in the scapular plane.  I think she probably has some cuff tendinopathy but if she has a tear it is likely small or partial-thickness.  We talked about further work-up with an MRI.  She says the pain is really not bad enough to justify that.  She will call me if she changes her mind but for now, she is going to live with the shoulder as is and follow-up as  needed.    Jus Valdez MD

## 2023-03-01 ENCOUNTER — OFFICE VISIT (OUTPATIENT)
Dept: ORTHOPEDIC SURGERY | Facility: CLINIC | Age: 68
End: 2023-03-01
Payer: MEDICARE

## 2023-03-01 VITALS — TEMPERATURE: 99.1 F | WEIGHT: 166.4 LBS | BODY MASS INDEX: 23.82 KG/M2 | HEIGHT: 70 IN

## 2023-03-01 DIAGNOSIS — M25.512 LEFT SHOULDER PAIN, UNSPECIFIED CHRONICITY: Primary | ICD-10-CM

## 2023-03-01 PROCEDURE — 99213 OFFICE O/P EST LOW 20 MIN: CPT | Performed by: ORTHOPAEDIC SURGERY

## 2023-03-01 RX ORDER — TRAMADOL HYDROCHLORIDE 50 MG/1
50 TABLET ORAL EVERY 4 HOURS PRN
Qty: 60 TABLET | Refills: 0 | Status: SHIPPED | OUTPATIENT
Start: 2023-03-01

## 2023-03-01 RX ORDER — BUDESONIDE, GLYCOPYRROLATE, AND FORMOTEROL FUMARATE 160; 9; 4.8 UG/1; UG/1; UG/1
AEROSOL, METERED RESPIRATORY (INHALATION)
COMMUNITY
Start: 2022-10-28

## 2023-03-01 NOTE — PROGRESS NOTES
Patient: Yenny Iglesias    YOB: 1955    Medical Record Number: [id number]    Chief Complaints: Follow-up regarding left shoulder pain    History of Present Illness:     67 y.o. female patient who presents for follow-up of the left shoulder.  She says the shoulder has really been making her miserable lately.  She has recently gotten a clean bill of health with respect to her breast cancer.  She says that she is completely cancer free and has been cleared to just follow-up with yearly mammograms going forward.  Despite this good news, the shoulder seems to be getting worse.  She is tearful in describing how this has affected her quality of life.  She frequently has to use her right arm to raise to the left overhead.  She reports constant aching discomfort.  She says the symptoms are as bad if not worse than the symptoms she was having on the right side prior to the surgery.  The last injection did help but she says she feels like she needs a more permanent solution going forward.    Allergies: No Known Allergies    Home Medications:    Current Outpatient Medications:   •  acetaminophen (TYLENOL) 500 MG tablet, Take 1 tablet by mouth Every 6 (Six) Hours As Needed for Mild Pain., Disp: , Rfl:   •  albuterol sulfate  (90 Base) MCG/ACT inhaler, Inhale 2 puffs Every 4 (Four) Hours As Needed., Disp: , Rfl:   •  anastrozole (ARIMIDEX) 1 MG tablet, , Disp: , Rfl:   •  aspirin 81 MG EC tablet, Take 1 tablet by mouth Daily. HELD FOR OR, Disp: , Rfl:   •  atorvastatin (LIPITOR) 40 MG tablet, Take 1 tablet by mouth Daily., Disp: , Rfl:   •  Budeson-Glycopyrrol-Formoterol (Breztri Aerosphere) 160-9-4.8 MCG/ACT aerosol inhaler, , Disp: , Rfl:   •  cetirizine (zyrTEC) 10 MG tablet, Take 1 tablet by mouth Daily., Disp: , Rfl:   •  Cholecalciferol (VITAMIN D) 50 MCG (2000 UT) tablet, Take 2,000 Units by mouth Daily., Disp: , Rfl:   •  famotidine (PEPCID) 20 MG tablet, Take 1 tablet by mouth Daily., Disp: ,  "Rfl:   •  metoprolol succinate XL (TOPROL-XL) 50 MG 24 hr tablet, Take 1 tablet by mouth Every Night., Disp: , Rfl:   •  oxybutynin XL (DITROPAN-XL) 5 MG 24 hr tablet, , Disp: , Rfl:   •  Probiotic Product (ALIGN) 4 MG capsule, Take 1 tablet by mouth Daily., Disp: , Rfl:   •  temazepam (RESTORIL) 30 MG capsule, Take 15 mg by mouth Every Night., Disp: , Rfl: 2  •  valsartan-hydrochlorothiazide (DIOVAN-HCT) 160-25 MG per tablet, Take 1 tablet by mouth Daily., Disp: , Rfl:   •  Melatonin 10 MG tablet, take one tablet by oral route at bedtime, Disp: , Rfl:   No current facility-administered medications for this visit.    Facility-Administered Medications Ordered in Other Visits:   •  Chlorhexidine Gluconate 2 % pads 1 each, 1 each, Apply externally, Take As Directed, Jus Valdez MD    Past Medical History:   Diagnosis Date   • Arthritis    • Asthma    • Chest pain     SEEN BY PCP 2020  - [facility] EVERYWHERE, STATED \"DID NOT WANT TO SEE CARDIO OR GET STRESS TEST\" IN PCP NOTE    • COPD (chronic obstructive pulmonary disease) (HCC)    • Dyspnea    • History of transfusion    • Hyperlipidemia    • Hypertension    • IBS (irritable bowel syndrome)    • On home oxygen therapy     3L NC PRN and at bedtime       Past Surgical History:   Procedure Laterality Date   • ANKLE HARDWARE REMOVAL Left 2008   • BACK SURGERY  2008   • LUMBAR DISCECTOMY     • ORIF ANKLE FRACTURE Left    • ROTATOR CUFF REPAIR Right 10/20/2014   • TOTAL SHOULDER ARTHROPLASTY W/ DISTAL CLAVICLE EXCISION Right 2020    Procedure: Reverse Total Shoulder Arthroplasty;  Surgeon: Jus Valdez MD;  Location: [facility] OR;  Service: Orthopedics;  Laterality: Right;       Social History     Occupational History   • Not on file   Tobacco Use   • Smoking status: Former     Packs/day: 1.00     Years: 20.00     Pack years: 20.00     Types: Cigarettes     Quit date: 1998     Years since quittin.7   • Smokeless tobacco: " "Never   Vaping Use   • Vaping Use: Never used   Substance and Sexual Activity   • Alcohol use: Yes     Comment: 4 TIMES/ WEEK    • Drug use: Never   • Sexual activity: Defer      Social History     Social History Narrative   • Not on file       Family History   Problem Relation Age of Onset   • Hypertension Other    • Diabetes Other    • Heart disease Other    • Malig Hyperthermia Neg Hx        Review of Systems:      Constitutional: Denies fever, shaking or chills   Eyes: Denies change in visual acuity   HEENT: Denies nasal congestion or sore throat   Respiratory: Denies cough or shortness of breath   Cardiovascular: Denies chest pain or edema  Endocrine: Denies tremors, palpitations, intolerance of heat or cold, polyuria, polydipsia.  GI: Denies abdominal pain, nausea, vomiting, bloody stools or diarrhea  : Denies frequency, urgency, incontinence, retention, or nocturia.  Musculoskeletal: Denies numbness, tingling or loss of motor function except as above  Integument: Denies rash, lesion or ulceration   Neurologic: Denies headache or focal weakness, deficits  Heme: Denies spontaneous or excessive bleeding, epistaxis, hematuria, melena, fatigue, enlarged or tender lymph nodes.      All other pertinent positives and negatives as noted above in HPI.    Physical Exam:   67 y.o. female  Vitals:    03/01/23 1521   Temp: 99.1 °F (37.3 °C)   Weight: 75.5 kg (166 lb 6.4 oz)   Height: 177.8 cm (70\")     General:  Patient is awake and alert.  Appears in no acute distress or discomfort.    Psych:  Affect and demeanor are appropriate.    Extremities: Left shoulder is examined.  Skin is benign.  Trace bursal effusion.  Mild to moderate anterior tenderness.  She has good motion but she struggles with both abduction and forward elevation.  She has pain and weakness with abduction, forward elevation.  She has discomfort with a belly press maneuver as well with a slight wrist break.  Normal motor and sensory function in the lower " arm and hand.  Palpable radial pulse.  Brisk capillary refill.    Imaging: No new x-rays taken today.  Previous x-rays are again reviewed and show no obvious source of the patient's persistent symptomatology.    Assessment/Plan:  Left shoulder pain and weakness, suspected rotator cuff tear    Her pain and weakness on exam seem to be much worse than her last visit.  I am concerned that any tear she had last time may have gotten worse.  We discussed options.  I recommend an MRI at this point to better evaluate the source of her pain.  I told her that I will review the MRI and then call with the results.  We will come up with a plan pending review of that study.  She is having trouble sleeping and requested something for the pain.  I agreed to give her a prescription for Ultram to take as needed.  Risk were discussed.    Jus Valdez MD    03/01/2023

## 2023-03-23 DIAGNOSIS — M25.512 LEFT SHOULDER PAIN, UNSPECIFIED CHRONICITY: ICD-10-CM

## 2023-03-24 ENCOUNTER — TELEPHONE (OUTPATIENT)
Dept: ORTHOPEDIC SURGERY | Facility: CLINIC | Age: 68
End: 2023-03-24
Payer: MEDICARE

## 2023-03-24 NOTE — TELEPHONE ENCOUNTER
I spoke with her and gave her the results of the MRI.  We discussed options.  She is in no hurry to consider surgical options.  I will have my  contact her to set up an appointment with me

## 2023-05-01 ENCOUNTER — OFFICE VISIT (OUTPATIENT)
Dept: ORTHOPEDIC SURGERY | Facility: CLINIC | Age: 68
End: 2023-05-01
Payer: MEDICARE

## 2023-05-01 VITALS — TEMPERATURE: 97.5 F | WEIGHT: 161.2 LBS | BODY MASS INDEX: 26.86 KG/M2 | HEIGHT: 65 IN

## 2023-05-01 DIAGNOSIS — M25.512 CHRONIC LEFT SHOULDER PAIN: Primary | ICD-10-CM

## 2023-05-01 DIAGNOSIS — G89.29 CHRONIC LEFT SHOULDER PAIN: Primary | ICD-10-CM

## 2023-05-01 RX ORDER — METHYLPREDNISOLONE ACETATE 80 MG/ML
80 INJECTION, SUSPENSION INTRA-ARTICULAR; INTRALESIONAL; INTRAMUSCULAR; SOFT TISSUE
Status: COMPLETED | OUTPATIENT
Start: 2023-05-01 | End: 2023-05-01

## 2023-05-01 RX ORDER — LIDOCAINE HYDROCHLORIDE 10 MG/ML
2 INJECTION, SOLUTION EPIDURAL; INFILTRATION; INTRACAUDAL; PERINEURAL
Status: COMPLETED | OUTPATIENT
Start: 2023-05-01 | End: 2023-05-01

## 2023-05-01 RX ADMIN — METHYLPREDNISOLONE ACETATE 80 MG: 80 INJECTION, SUSPENSION INTRA-ARTICULAR; INTRALESIONAL; INTRAMUSCULAR; SOFT TISSUE at 15:07

## 2023-05-01 RX ADMIN — LIDOCAINE HYDROCHLORIDE 2 ML: 10 INJECTION, SOLUTION EPIDURAL; INFILTRATION; INTRACAUDAL; PERINEURAL at 15:07

## 2023-05-01 NOTE — PROGRESS NOTES
Patient:Yenny Iglesias    YOB: 1955    Medical Record Number:[id number]    Chief Complaints:  Left shoulder pain    History of Present Illness:     67 y.o. female patient who presents for follow-up of her left shoulder.  Symptoms are about the same.  She reports moderate discomfort which is worse at night and when she tries to reach or lift.  She has good days and bad days.  She recently got an MRI.  We had discussed those results over the phone.  She comes in today to continue that discussion.    Allergies:No Known Allergies    Home Medications:    Current Outpatient Medications:   •  acetaminophen (TYLENOL) 500 MG tablet, Take 1 tablet by mouth Every 6 (Six) Hours As Needed for Mild Pain., Disp: , Rfl:   •  albuterol sulfate  (90 Base) MCG/ACT inhaler, Inhale 2 puffs Every 4 (Four) Hours As Needed., Disp: , Rfl:   •  anastrozole (ARIMIDEX) 1 MG tablet, , Disp: , Rfl:   •  aspirin 81 MG EC tablet, Take 1 tablet by mouth Daily. HELD FOR OR, Disp: , Rfl:   •  atorvastatin (LIPITOR) 40 MG tablet, Take 1 tablet by mouth Daily., Disp: , Rfl:   •  Budeson-Glycopyrrol-Formoterol (Breztri Aerosphere) 160-9-4.8 MCG/ACT aerosol inhaler, , Disp: , Rfl:   •  cetirizine (zyrTEC) 10 MG tablet, Take 1 tablet by mouth Daily., Disp: , Rfl:   •  Cholecalciferol (VITAMIN D) 50 MCG (2000 UT) tablet, Take 2,000 Units by mouth Daily., Disp: , Rfl:   •  famotidine (PEPCID) 20 MG tablet, Take 1 tablet by mouth Daily., Disp: , Rfl:   •  metoprolol succinate XL (TOPROL-XL) 50 MG 24 hr tablet, Take 1 tablet by mouth Every Night., Disp: , Rfl:   •  oxybutynin XL (DITROPAN-XL) 5 MG 24 hr tablet, , Disp: , Rfl:   •  Probiotic Product (ALIGN) 4 MG capsule, Take 1 tablet by mouth Daily., Disp: , Rfl:   •  temazepam (RESTORIL) 30 MG capsule, Take 15 mg by mouth Every Night., Disp: , Rfl: 2  •  traMADol (ULTRAM) 50 MG tablet, Take 1 tablet by mouth Every 4 (Four) Hours As Needed for Moderate Pain., Disp: 60 tablet, Rfl: 0  •   "valsartan-hydrochlorothiazide (DIOVAN-HCT) 160-25 MG per tablet, Take 1 tablet by mouth Daily., Disp: , Rfl:   •  Melatonin 10 MG tablet, take one tablet by oral route at bedtime (Patient not taking: Reported on 2023), Disp: , Rfl:   No current facility-administered medications for this visit.    Facility-Administered Medications Ordered in Other Visits:   •  Chlorhexidine Gluconate 2 % pads 1 each, 1 each, Apply externally, Take As Directed, Jus Valdez MD    Past Medical History:   Diagnosis Date   • Arthritis    • Asthma    • Chest pain     SEEN BY PCP 2020  - [facility] EVERYWHERE, STATED \"DID NOT WANT TO SEE CARDIO OR GET STRESS TEST\" IN PCP NOTE    • COPD (chronic obstructive pulmonary disease)    • Dyspnea    • History of transfusion    • Hyperlipidemia    • Hypertension    • IBS (irritable bowel syndrome)    • On home oxygen therapy     3L NC PRN and at bedtime       Past Surgical History:   Procedure Laterality Date   • ANKLE HARDWARE REMOVAL Left 2008   • BACK SURGERY  2008   • LUMBAR DISCECTOMY     • ORIF ANKLE FRACTURE Left    • ROTATOR CUFF REPAIR Right 10/20/2014   • TOTAL SHOULDER ARTHROPLASTY W/ DISTAL CLAVICLE EXCISION Right 2020    Procedure: Reverse Total Shoulder Arthroplasty;  Surgeon: Jus Valdez MD;  Location: [facility];  Service: Orthopedics;  Laterality: Right;       Social History     Occupational History   • Not on file   Tobacco Use   • Smoking status: Former     Packs/day: 1.00     Years: 20.00     Pack years: 20.00     Types: Cigarettes     Quit date: 1998     Years since quittin.8     Passive exposure: Past   • Smokeless tobacco: Never   Vaping Use   • Vaping Use: Never used   Substance and Sexual Activity   • Alcohol use: Yes     Comment: 4 TIMES/ WEEK    • Drug use: Never   • Sexual activity: Defer      Social History     Social History Narrative   • Not on file       Family History   Problem Relation Age of Onset   • " "Hypertension Mother    • Diabetes Mother    • Hypertension Other    • Diabetes Other    • Heart disease Other    • Malig Hyperthermia Neg Hx        Review of Systems:      Constitutional: Denies fever, shaking or chills   Eyes: Denies change in visual acuity   HEENT: Denies nasal congestion or sore throat   Respiratory: Denies cough or shortness of breath   Cardiovascular: Denies chest pain or edema  Endocrine: Denies tremors, palpitations, intolerance of heat or cold, polyuria, polydipsia.  GI: Denies abdominal pain, nausea, vomiting, bloody stools or diarrhea  : Denies frequency, urgency, incontinence, retention, or nocturia.  Musculoskeletal: Denies numbness, tingling or loss of motor function except as above  Integument: Denies rash, lesion or ulceration   Neurologic: Denies headache or focal weakness, deficits  Heme: Denies spontaneous or excessive bleeding, epistaxis, hematuria, melena, fatigue, enlarged or tender lymph nodes.      All other pertinent positives and negatives as noted above in HPI.    Physical Exam:67 y.o. female  Vitals:    05/01/23 1436   Temp: 97.5 °F (36.4 °C)   Weight: 73.1 kg (161 lb 3.2 oz)   Height: 165.1 cm (65\")       General:  Patient is awake and alert.  Appears in no acute distress or discomfort.    Psych:  Affect and demeanor are appropriate.    Extremities: Left shoulder was just briefly examined.  She has good motion.  She has pain and weakness with forward elevation and abduction.    Imaging: MRI left shoulder is reviewed along with the associated report.  I have independently interpreted the images.  She has high-grade partial-thickness tears involving the supraspinatus, infraspinatus and subscapularis.  She has subacromial/subdeltoid bursitis and extensive biceps tendinopathy.  Mild glenohumeral arthritis as well.    Assessment/Plan: Extensive partial-thickness left rotator cuff tearing with subacromial subdeltoid bursitis and biceps tendinopathy    I showed her the MRI.  " We discussed the significance of the findings and her options.  She went through a repair on the right side.  Ultimately it failed and she ended up having to undergo the replacement.  She does not want to have to go through another repair.  I assured her there is no hurry to consider the arthroplasty.  If that is the route she would like to go ultimately then my recommendation would be that we try to manage this conservatively  for as long as possible.  She is in agreement with that plan.  The risk, benefits and alternatives to subacromial injection were discussed.  She consented and the injection was performed as described below.  She will follow-up as needed.    Large Joint Arthrocentesis: L subacromial bursa  Date/Time: 5/1/2023 3:07 PM  Consent given by: patient  Site marked: site marked  Timeout: Immediately prior to procedure a time out was called to verify the correct patient, procedure, equipment, support staff and site/side marked as required   Supporting Documentation  Indications: pain   Procedure Details  Location: shoulder - L subacromial bursa  Preparation: Patient was prepped and draped in the usual sterile fashion  Needle gauge: 21G.  Approach: posterior  Medications administered: 80 mg methylPREDNISolone acetate 80 MG/ML; 2 mL lidocaine PF 1% 1 %  Patient tolerance: patient tolerated the procedure well with no immediate complications          Jus Valdez MD    05/01/2023

## 2023-06-16 ENCOUNTER — TELEPHONE (OUTPATIENT)
Dept: ORTHOPEDIC SURGERY | Facility: CLINIC | Age: 68
End: 2023-06-16

## 2023-06-16 NOTE — TELEPHONE ENCOUNTER
Caller: RHONDA GUAJARDO    Relationship to patient: SELF    Best call back number: [phone]    Chief complaint: LEFT SHOULDER PAIN    Type of visit: CORTISONE INJECTION    Requested date: ASAP    If rescheduling, when is the original appointment: N/A     Additional notes: LAST INJECTION 5/1; PREFERS AFTERNOON AT [facility]

## 2023-08-14 ENCOUNTER — CLINICAL SUPPORT (OUTPATIENT)
Dept: ORTHOPEDIC SURGERY | Facility: CLINIC | Age: 68
End: 2023-08-14
Payer: MEDICARE

## 2023-08-14 ENCOUNTER — TELEPHONE (OUTPATIENT)
Dept: ORTHOPEDIC SURGERY | Facility: CLINIC | Age: 68
End: 2023-08-14

## 2023-08-14 VITALS — WEIGHT: 164.3 LBS | HEIGHT: 70 IN | TEMPERATURE: 98.9 F | BODY MASS INDEX: 23.52 KG/M2

## 2023-08-14 DIAGNOSIS — G89.29 CHRONIC LEFT SHOULDER PAIN: Primary | ICD-10-CM

## 2023-08-14 DIAGNOSIS — M25.512 CHRONIC LEFT SHOULDER PAIN: Primary | ICD-10-CM

## 2023-08-14 RX ORDER — METHYLPREDNISOLONE ACETATE 80 MG/ML
80 INJECTION, SUSPENSION INTRA-ARTICULAR; INTRALESIONAL; INTRAMUSCULAR; SOFT TISSUE
Status: COMPLETED | OUTPATIENT
Start: 2023-08-14 | End: 2023-08-14

## 2023-08-14 RX ORDER — LIDOCAINE HYDROCHLORIDE 10 MG/ML
2 INJECTION, SOLUTION EPIDURAL; INFILTRATION; INTRACAUDAL; PERINEURAL
Status: COMPLETED | OUTPATIENT
Start: 2023-08-14 | End: 2023-08-14

## 2023-08-14 RX ADMIN — METHYLPREDNISOLONE ACETATE 80 MG: 80 INJECTION, SUSPENSION INTRA-ARTICULAR; INTRALESIONAL; INTRAMUSCULAR; SOFT TISSUE at 14:41

## 2023-08-14 RX ADMIN — LIDOCAINE HYDROCHLORIDE 2 ML: 10 INJECTION, SOLUTION EPIDURAL; INFILTRATION; INTRACAUDAL; PERINEURAL at 14:41

## 2023-08-14 NOTE — PROGRESS NOTES
Ms. Iglesias is up for her left shoulder.  AP, scapular Y and axillary views left shoulder are ordered and reviewed to evaluate her complaint.  These are compared to previous x-rays.  No new or concerning findings.  We again discussed her options.  She wanted another injection.  The risks, benefits and alternatives were discussed and the patient consented.  Going forward, the patient will follow-up as needed.    Jus Valdez MD    08/14/2023     Large Joint Arthrocentesis: L subacromial bursa  Date/Time: 8/14/2023 2:41 PM  Consent given by: patient  Site marked: site marked  Timeout: Immediately prior to procedure a time out was called to verify the correct patient, procedure, equipment, support staff and site/side marked as required   Supporting Documentation  Indications: pain   Procedure Details  Location: shoulder - L subacromial bursa  Preparation: Patient was prepped and draped in the usual sterile fashion  Needle gauge: 21G.  Approach: posterior  Medications administered: 80 mg methylPREDNISolone acetate 80 MG/ML; 2 mL lidocaine PF 1% 1 %  Patient tolerance: patient tolerated the procedure well with no immediate complications

## (undated) DEVICE — TRAP FLD MINIVAC MEGADYNE 100ML

## (undated) DEVICE — POOLE SUCTION HANDLE: Brand: CARDINAL HEALTH

## (undated) DEVICE — BIT DRL SCRW PERIPH 2.7MM

## (undated) DEVICE — PENCL E/S ULTRAVAC TELESCP NOSE HOLSTR 10FT

## (undated) DEVICE — PREP SOL POVIDONE/IODINE BT 4OZ

## (undated) DEVICE — DRAPE,U/ SHT,SPLIT,PLAS,STERIL: Brand: MEDLINE

## (undated) DEVICE — SUT VIC 2/0 CT2 27IN J269H

## (undated) DEVICE — DRSNG TELFA PAD NONADH STR 1S 3X8IN

## (undated) DEVICE — GLV SURG BIOGEL LTX PF 8 1/2

## (undated) DEVICE — HANDPIECE SET WITH COAXIAL HIGH FLOW TIP AND SUCTION TUBE: Brand: INTERPULSE

## (undated) DEVICE — ARM SLING: Brand: DEROYAL

## (undated) DEVICE — APPL CHLORAPREP HI/LITE 26ML ORNG

## (undated) DEVICE — ADHS SKIN DERMABOND TOP ADVANCED

## (undated) DEVICE — SOL ISO/ALC RUB 70PCT 4OZ

## (undated) DEVICE — PK SHLDR OPN 40

## (undated) DEVICE — MAT FLR ABSORBENT LG 4FT 10 2.5FT

## (undated) DEVICE — DUAL CUT SAGITTAL BLADE

## (undated) DEVICE — 3M™ IOBAN™ 2 ANTIMICROBIAL INCISE DRAPE 6640EZ: Brand: IOBAN™ 2

## (undated) DEVICE — PIN STNMAN 3.2MM 9IN
Type: IMPLANTABLE DEVICE | Site: SHOULDER | Status: NON-FUNCTIONAL
Removed: 2020-09-17

## (undated) DEVICE — GLV SURG SIGNATURE ESSENTIAL PF LTX SZ8.5

## (undated) DEVICE — SKIN PREP TRAY W/CHG: Brand: MEDLINE INDUSTRIES, INC.

## (undated) DEVICE — SUT SILK 2/0 TIES 18IN A185H

## (undated) DEVICE — DRSNG SURESITE WNDW 4X4.5